# Patient Record
Sex: FEMALE | Race: WHITE | Employment: OTHER | URBAN - METROPOLITAN AREA
[De-identification: names, ages, dates, MRNs, and addresses within clinical notes are randomized per-mention and may not be internally consistent; named-entity substitution may affect disease eponyms.]

---

## 2017-01-19 ENCOUNTER — OFFICE VISIT (OUTPATIENT)
Dept: INTERNAL MEDICINE CLINIC | Facility: CLINIC | Age: 57
End: 2017-01-19

## 2017-01-19 VITALS
DIASTOLIC BLOOD PRESSURE: 68 MMHG | HEIGHT: 64 IN | WEIGHT: 213 LBS | SYSTOLIC BLOOD PRESSURE: 102 MMHG | BODY MASS INDEX: 36.37 KG/M2 | RESPIRATION RATE: 16 BRPM | HEART RATE: 80 BPM

## 2017-01-19 VITALS — BODY MASS INDEX: 37 KG/M2 | WEIGHT: 215 LBS

## 2017-01-19 DIAGNOSIS — E66.9 OBESITY (BMI 30-39.9): ICD-10-CM

## 2017-01-19 DIAGNOSIS — E66.9 OBESITY, CLASS II, BMI 35-39.9: ICD-10-CM

## 2017-01-19 DIAGNOSIS — Z51.81 ENCOUNTER FOR THERAPEUTIC DRUG MONITORING: Primary | ICD-10-CM

## 2017-01-19 PROCEDURE — 99213 OFFICE O/P EST LOW 20 MIN: CPT | Performed by: NURSE PRACTITIONER

## 2017-01-19 PROCEDURE — 97803 MED NUTRITION INDIV SUBSEQ: CPT | Performed by: DIETITIAN, REGISTERED

## 2017-01-19 RX ORDER — TOPIRAMATE 25 MG/1
50 TABLET ORAL 2 TIMES DAILY
Qty: 120 TABLET | Refills: 1 | Status: SHIPPED | OUTPATIENT
Start: 2017-01-19 | End: 2017-02-16 | Stop reason: SINTOL

## 2017-01-19 NOTE — PATIENT INSTRUCTIONS
Keep up the exercise!! Keep eating more protein than carbs use ideas from dietician. Healthy Tips for Eating Out  You don't have to give up eating out to cut down on fat, cholesterol, and salt. You just need to think about what you order.  Many men

## 2017-01-19 NOTE — PROGRESS NOTES
FOLLOW UP NUTRITION CONSULTATION    Nutrition Assessment    Number of consultations with dietitian: 2    Height:  Ht Readings from Last 1 Encounters:  12/15/16 : 64\"      Weight:   Wt Readings from Last 2 Encounters:  01/19/17 : 215 lb  12/15/16 : 218

## 2017-01-19 NOTE — PROGRESS NOTES
CC: Patient presents with:  Weight Check: lost 5 pounds       HPI:   Obesity. Patient is tolerating topamax without problems and has helped her reduce cars an dfeels more full.        Current Outpatient Prescriptions:  topiramate 25 MG Oral Tab Take 2 repeat u/s   • Colon polyp 2012 (due 12/2015)     DR. Judge Hare   • COPD (chronic obstructive pulmonary disease) ([facility] [address]    • Asthma      last inhaler use few weeks ago   • Arrhythmia      history A-fib post left lung surgery; states no longer, sees cardiology afshan Signed Prescriptions Disp Refills    topiramate 25 MG Oral Tab 120 tablet 1      Sig: Take 2 tablets (50 mg total) by mouth 2 (two) times daily.           None     ASSESSMENT:   Encounter for therapeutic drug monitoring  (primary encounter diagnosis)  O

## 2017-02-16 ENCOUNTER — OFFICE VISIT (OUTPATIENT)
Dept: INTERNAL MEDICINE CLINIC | Facility: CLINIC | Age: 57
End: 2017-02-16

## 2017-02-16 VITALS
WEIGHT: 213 LBS | RESPIRATION RATE: 16 BRPM | HEART RATE: 100 BPM | DIASTOLIC BLOOD PRESSURE: 74 MMHG | BODY MASS INDEX: 36.37 KG/M2 | HEIGHT: 64 IN | SYSTOLIC BLOOD PRESSURE: 110 MMHG

## 2017-02-16 DIAGNOSIS — Z51.81 ENCOUNTER FOR THERAPEUTIC DRUG MONITORING: Primary | ICD-10-CM

## 2017-02-16 DIAGNOSIS — E66.9 OBESITY (BMI 30-39.9): ICD-10-CM

## 2017-02-16 PROCEDURE — 99213 OFFICE O/P EST LOW 20 MIN: CPT | Performed by: NURSE PRACTITIONER

## 2017-02-16 RX ORDER — TOPIRAMATE 25 MG/1
50 TABLET ORAL 2 TIMES DAILY
Qty: 120 TABLET | Refills: 1 | Status: CANCELLED | OUTPATIENT
Start: 2017-02-16 | End: 2017-04-17

## 2017-02-16 RX ORDER — METFORMIN HYDROCHLORIDE 750 MG/1
750 TABLET, EXTENDED RELEASE ORAL DAILY
Qty: 30 TABLET | Refills: 0 | Status: SHIPPED | OUTPATIENT
Start: 2017-02-16 | End: 2017-02-16

## 2017-02-16 RX ORDER — METFORMIN HYDROCHLORIDE 750 MG/1
750 TABLET, EXTENDED RELEASE ORAL DAILY
Qty: 30 TABLET | Refills: 0 | Status: SHIPPED | OUTPATIENT
Start: 2017-02-16 | End: 2017-03-16

## 2017-02-16 NOTE — PATIENT INSTRUCTIONS
MyPlate Worksheet: 2,885 Calories  Your calorie needs are about 1,400 calories a day. Below are the U.S. Department of Agriculture (USDA) guidelines for your daily recommended amount of each food group.   Vegetables  1½ cups Fruits  1½ cups Grains  5 ounc · Cut back on salt (sodium). Stay under 2,300 mg sodium a day. If you have a health condition such as heart disease or high blood pressure, your doctor will likely tell you to limit sodium to no more than 1,500 mg a day.   Get moving and be active!   Aim fo 2 tablespopravin vernonmus     Date Last Reviewed: 6/1/2015  © 5953-3806 The [address] [place] [address], [address] [place], [place] [address]. All rights reserved. This information is not intended as a substitute for professional medical care.  Always follow your · Limit the extras (solid fats and sugars, also called \"empty calories\") to 130 calories a day. · Cut back on salt (sodium). Stay under 2,300 mg sodium a day.  If you have a health condition such as heart disease or high blood pressure, your doctor will 1 tablespoon peanut or almond butter  ¼ cup cooked dry beans or peas  ½ cup tofu  2 tablespoons hummus     Date Last Reviewed: 6/1/2015  © 3427-6413 [address]. All rights reserved.  This information

## 2017-02-16 NOTE — PROGRESS NOTES
CC: Patient presents with:  Weight Check: no weight change       HPI:   Obesity. Patient did not like the increaed topamax as felt very foggy on it and too much paresthesia.  She does not want to try 25mg again as had done this month and parasthesia ring.   • HOSPITALIZATIONS 5/09     hosp for dyspnea- echo neg, stress neg, no pe, add advair, question gerd   • Nontoxic multinodular goiter 2012     few nodule left- due in aug 2012 repeat u/s   • Colon polyp 2012 (due 12/2015)     DR. Deena Guallpa   • COPD ( CARDIO: RRR without murmur  GI: +BS's    No orders of the defined types were placed in this encounter.         Signed Prescriptions Disp Refills    MetFORMIN HCl  MG Oral Tablet 24 Hr 30 tablet 0      Sig: Take 1 tablet (750 mg total) by mouth daily

## 2017-02-20 PROBLEM — M23.91 INTERNAL DERANGEMENT OF KNEE, RIGHT: Status: ACTIVE | Noted: 2017-02-20

## 2017-02-22 ENCOUNTER — APPOINTMENT (OUTPATIENT)
Dept: LAB | Age: 57
End: 2017-02-22
Attending: SURGERY
Payer: COMMERCIAL

## 2017-02-22 DIAGNOSIS — E04.1 THYROID NODULE: ICD-10-CM

## 2017-02-22 PROCEDURE — 88173 CYTOPATH EVAL FNA REPORT: CPT

## 2017-02-28 NOTE — PROGRESS NOTES
Quick Note:    isaiah, please notify patient with benign biopsy of the thyroid  Repeat US in 12 months for stability    ______

## 2017-03-10 PROBLEM — M23.92 INTERNAL DERANGEMENT OF KNEE, LEFT: Status: ACTIVE | Noted: 2017-03-10

## 2017-03-16 ENCOUNTER — OFFICE VISIT (OUTPATIENT)
Dept: INTERNAL MEDICINE CLINIC | Facility: CLINIC | Age: 57
End: 2017-03-16

## 2017-03-16 VITALS
RESPIRATION RATE: 16 BRPM | HEART RATE: 96 BPM | DIASTOLIC BLOOD PRESSURE: 72 MMHG | WEIGHT: 209 LBS | SYSTOLIC BLOOD PRESSURE: 108 MMHG | HEIGHT: 64 IN | BODY MASS INDEX: 35.68 KG/M2

## 2017-03-16 DIAGNOSIS — J01.91 ACUTE RECURRENT SINUSITIS, UNSPECIFIED LOCATION: ICD-10-CM

## 2017-03-16 DIAGNOSIS — E66.9 OBESITY (BMI 30-39.9): ICD-10-CM

## 2017-03-16 DIAGNOSIS — Z51.81 ENCOUNTER FOR THERAPEUTIC DRUG MONITORING: Primary | ICD-10-CM

## 2017-03-16 DIAGNOSIS — J06.9 UPPER RESPIRATORY TRACT INFECTION, UNSPECIFIED TYPE: ICD-10-CM

## 2017-03-16 PROCEDURE — 99213 OFFICE O/P EST LOW 20 MIN: CPT | Performed by: NURSE PRACTITIONER

## 2017-03-16 RX ORDER — METFORMIN HYDROCHLORIDE 750 MG/1
750 TABLET, EXTENDED RELEASE ORAL DAILY
Qty: 30 TABLET | Refills: 1 | Status: SHIPPED | OUTPATIENT
Start: 2017-03-16 | End: 2017-04-13

## 2017-03-16 RX ORDER — AZITHROMYCIN 250 MG/1
TABLET, FILM COATED ORAL
Qty: 6 TABLET | Refills: 0 | Status: SHIPPED | OUTPATIENT
Start: 2017-03-16 | End: 2017-03-21

## 2017-03-16 RX ORDER — LACTOBACIL 2/BIFIDO 1/S.THERMO 450B CELL
1 PACKET (EA) ORAL EVERY MORNING
Qty: 60 CAPSULE | Refills: 0 | Status: SHIPPED | OUTPATIENT
Start: 2017-03-16 | End: 2017-03-21

## 2017-03-16 NOTE — PROGRESS NOTES
CC: Patient presents with:  Weight Check: lost 4 pounds       HPI:   Obesity. Patient is doing well on metformin. Is coughing a more today. Sinuses draining in to throat congested in upper airway URI. She os going to PT for left knee injury and pain. Rfl: 1      Past Medical History   Diagnosis Date   • Allergic rhinitis, cause unspecified    • lung cancer 09/2003- see birth hx     Lung CA- neg pet  10/2010, RLL nodule unchanged from 09- benign   • GERD EGD (Ali)1/07 1/07 = EGD = schatzkis ring. Internal derangement of knee, left        REVIEW OF SYSTEMS:   RESPIRATORY: denies shortness of breath   CARDIOVASCULAR: denies chest pain  GI: denies diarrhea and GI upset    EXAM:   /72 mmHg  Pulse 96  Resp 16  Ht 64\"  Wt 209 lb  BMI 35.86 kg/m2 for weekend. She has cough medicine she takes at night. Lung sounds okay no wheezes or crackles noted on auscultation but congested upper aware. Started azithromyacin as a courtesy due to inabiliity to get in with PCP before leaving [facility].  Due to previous

## 2017-04-13 ENCOUNTER — OFFICE VISIT (OUTPATIENT)
Dept: INTERNAL MEDICINE CLINIC | Facility: CLINIC | Age: 57
End: 2017-04-13

## 2017-04-13 VITALS
WEIGHT: 207 LBS | DIASTOLIC BLOOD PRESSURE: 70 MMHG | BODY MASS INDEX: 35.34 KG/M2 | RESPIRATION RATE: 16 BRPM | SYSTOLIC BLOOD PRESSURE: 110 MMHG | HEIGHT: 64 IN | HEART RATE: 88 BPM

## 2017-04-13 DIAGNOSIS — E66.9 OBESITY (BMI 30-39.9): ICD-10-CM

## 2017-04-13 DIAGNOSIS — E66.9 OBESITY, CLASS II, BMI 35-39.9: ICD-10-CM

## 2017-04-13 DIAGNOSIS — Z51.81 ENCOUNTER FOR THERAPEUTIC DRUG MONITORING: Primary | ICD-10-CM

## 2017-04-13 PROCEDURE — 97803 MED NUTRITION INDIV SUBSEQ: CPT | Performed by: DIETITIAN, REGISTERED

## 2017-04-13 PROCEDURE — 99213 OFFICE O/P EST LOW 20 MIN: CPT | Performed by: NURSE PRACTITIONER

## 2017-04-13 RX ORDER — METFORMIN HYDROCHLORIDE 750 MG/1
1500 TABLET, EXTENDED RELEASE ORAL DAILY
Qty: 60 TABLET | Refills: 1 | Status: SHIPPED | OUTPATIENT
Start: 2017-04-13 | End: 2017-05-09

## 2017-04-13 NOTE — PROGRESS NOTES
CC: Patient presents with:  Weight Check: lost 2 pounds       HPI:   Obesity. Tolerating metformin and doing okay. She ended up having a 5 day prednisone pack twice and is on 3rd type of antibiotic to help clear her one lung and sinuses.       Maria Luisa Quintana 2 puffs into the lungs every 6 (six) hours as needed.  Disp: 1 Inhaler Rfl: 1      Past Medical History   Diagnosis Date   • Allergic rhinitis, cause unspecified    • lung cancer 09/2003- see birth hx     Lung CA- neg pet  10/2010, RLL nodule unchanged from epicondylitis     DULCE MARIA on CPAP     Internal derangement of knee, right     Internal derangement of knee, left        REVIEW OF SYSTEMS:   RESPIRATORY: denies shortness of breath   CARDIOVASCULAR: denies chest pain  GI: denies diarrhea.      EXAM:   /70

## 2017-04-13 NOTE — PATIENT INSTRUCTIONS
Healthy Tips for Eating Out  You don't have to give up eating out to cut down on fat, cholesterol, and salt. You just need to think about what you order. Many menus highlight low-fat and low-sodium dishes. But if you can't find what you want, ask.  Gillian Dudley You don't have to give up eating out to cut down on fat, cholesterol, and salt. You just need to think about what you order. Many menus highlight low-fat and low-sodium dishes. But if you can't find what you want, ask.  Explain what you need to the  o

## 2017-04-14 VITALS — WEIGHT: 207 LBS | BODY MASS INDEX: 36 KG/M2

## 2017-04-14 NOTE — PROGRESS NOTES
FOLLOW UP NUTRITION CONSULTATION    Nutrition Assessment    Number of consultations with dietitian: 3    Height:  Ht Readings from Last 1 Encounters:  04/13/17 : 64\"      Weight:   Wt Readings from Last 2 Encounters:  04/13/17 : 207 lb  04/13/17 : 207

## 2017-05-09 ENCOUNTER — OFFICE VISIT (OUTPATIENT)
Dept: INTERNAL MEDICINE CLINIC | Facility: CLINIC | Age: 57
End: 2017-05-09

## 2017-05-09 VITALS
HEART RATE: 96 BPM | BODY MASS INDEX: 34.66 KG/M2 | WEIGHT: 203 LBS | HEIGHT: 64 IN | DIASTOLIC BLOOD PRESSURE: 76 MMHG | SYSTOLIC BLOOD PRESSURE: 112 MMHG | RESPIRATION RATE: 16 BRPM

## 2017-05-09 DIAGNOSIS — Z51.81 ENCOUNTER FOR THERAPEUTIC DRUG MONITORING: Primary | ICD-10-CM

## 2017-05-09 DIAGNOSIS — E66.9 OBESITY (BMI 30-39.9): ICD-10-CM

## 2017-05-09 DIAGNOSIS — K21.00 GASTROESOPHAGEAL REFLUX DISEASE WITH ESOPHAGITIS: ICD-10-CM

## 2017-05-09 PROCEDURE — 99213 OFFICE O/P EST LOW 20 MIN: CPT | Performed by: NURSE PRACTITIONER

## 2017-05-09 RX ORDER — METFORMIN HYDROCHLORIDE 750 MG/1
1500 TABLET, EXTENDED RELEASE ORAL DAILY
Qty: 60 TABLET | Refills: 1 | Status: SHIPPED | OUTPATIENT
Start: 2017-05-09 | End: 2017-07-11

## 2017-05-09 NOTE — PATIENT INSTRUCTIONS
Try to eat dinner earlier and see if helps with GERD       Lifestyle Changes for Controlling GERD    When you have GERD, stomach acid feels as if it’s backing up toward your mouth.  Whether or not you take medicine to control your GERD, your symptoms can of © 8344-6775 [address], [address] [place]. All rights reserved. This information is not intended as a substitute for professional medical care. Always follow your healthcare professional's instructions.

## 2017-05-09 NOTE — PROGRESS NOTES
CC: Patient presents with:  Weight Check: lost 4 pounds       HPI:   Obesity. Patient is doing well on metformin and feels trokendi helped and no side effects like she had on topamax. She has been having more GERD symptoms as eating a lot later.  Sh Inhale 2 puffs into the lungs every 6 (six) hours as needed.  Disp: 1 Inhaler Rfl: 1      Past Medical History   Diagnosis Date   • Allergic rhinitis, cause unspecified    • lung cancer 09/2003- see birth hx     Lung CA- neg pet  10/2010, RLL nodule unchang lateral epicondylitis     DULCE MARIA on CPAP     Internal derangement of knee, right     Internal derangement of knee, left     Gastroesophageal reflux disease with esophagitis        REVIEW OF SYSTEMS:   RESPIRATORY: denies shortness of breath   CARDIOVASCULAR: and associated with back pain, jaw pain, arm pain or nausea. She states she is aware of symptoms. The patient indicates understanding of these issues and agrees to the plan. Return in about 4 weeks (around 6/6/2017).

## 2017-05-31 RX ORDER — TOPIRAMATE 25 MG/1
CAPSULE, EXTENDED RELEASE ORAL
Qty: 30 CAPSULE | Refills: 0 | OUTPATIENT
Start: 2017-05-31

## 2017-06-06 ENCOUNTER — PATIENT MESSAGE (OUTPATIENT)
Dept: INTERNAL MEDICINE CLINIC | Facility: CLINIC | Age: 57
End: 2017-06-06

## 2017-06-06 DIAGNOSIS — E66.9 OBESITY (BMI 30-39.9): ICD-10-CM

## 2017-06-06 DIAGNOSIS — Z51.81 ENCOUNTER FOR THERAPEUTIC DRUG MONITORING: ICD-10-CM

## 2017-06-06 NOTE — TELEPHONE ENCOUNTER
From: Oxana Butts  To: JL Cruz  Sent: 6/6/2017 11:53 AM CDT  Subject: Prescription Question    My Trokendi was not approved for a refill by you?  I dont have an appointment for this month because of a cancellation by your office, awilda

## 2017-06-06 NOTE — TELEPHONE ENCOUNTER
From: Helen Capellan  To: JL Macias  Sent: 6/6/2017 11:51 AM CDT  Subject: Medication Renewal Request    Original authorizing provider: JL Smith would like a refill of the following medications:  Topiramate E

## 2017-06-07 ENCOUNTER — PATIENT MESSAGE (OUTPATIENT)
Dept: INTERNAL MEDICINE CLINIC | Facility: CLINIC | Age: 57
End: 2017-06-07

## 2017-06-07 RX ORDER — TOPIRAMATE 25 MG/1
CAPSULE, EXTENDED RELEASE ORAL
Qty: 30 CAPSULE | Refills: 0 | OUTPATIENT
Start: 2017-06-07

## 2017-06-07 NOTE — TELEPHONE ENCOUNTER
From: Heidi Carrillo  To: JL Murray  Sent: 6/7/2017 4:10 PM CDT  Subject: Prescription Question    Andre still states that there is no refills available for gulshanndi for me can you please send a new one?     Thank you    Jasmin Jiménez

## 2017-06-12 ENCOUNTER — PATIENT MESSAGE (OUTPATIENT)
Dept: INTERNAL MEDICINE CLINIC | Facility: CLINIC | Age: 57
End: 2017-06-12

## 2017-06-12 DIAGNOSIS — Z51.81 ENCOUNTER FOR THERAPEUTIC DRUG MONITORING: Primary | ICD-10-CM

## 2017-06-12 DIAGNOSIS — E66.9 OBESITY (BMI 30-39.9): ICD-10-CM

## 2017-06-12 NOTE — TELEPHONE ENCOUNTER
From: Compa Linares  To: JL Sutherland  Sent: 6/12/2017 7:56 AM CDT  Subject: Prescription Question    Yasir Andrews,    I get my prescriptions filled st the [facility]     Thanks for your help    Palomo

## 2017-07-11 ENCOUNTER — OFFICE VISIT (OUTPATIENT)
Dept: INTERNAL MEDICINE CLINIC | Facility: CLINIC | Age: 57
End: 2017-07-11

## 2017-07-11 VITALS
BODY MASS INDEX: 34.49 KG/M2 | SYSTOLIC BLOOD PRESSURE: 106 MMHG | HEART RATE: 80 BPM | DIASTOLIC BLOOD PRESSURE: 70 MMHG | WEIGHT: 202 LBS | RESPIRATION RATE: 16 BRPM | HEIGHT: 64 IN

## 2017-07-11 DIAGNOSIS — Z85.118 PERSONAL HISTORY OF MALIGNANT NEOPLASM OF BRONCHUS AND LUNG: ICD-10-CM

## 2017-07-11 DIAGNOSIS — K21.9 GASTROESOPHAGEAL REFLUX DISEASE WITHOUT ESOPHAGITIS: ICD-10-CM

## 2017-07-11 DIAGNOSIS — E66.9 OBESITY (BMI 30-39.9): ICD-10-CM

## 2017-07-11 DIAGNOSIS — Z51.81 ENCOUNTER FOR THERAPEUTIC DRUG MONITORING: ICD-10-CM

## 2017-07-11 PROCEDURE — 99213 OFFICE O/P EST LOW 20 MIN: CPT | Performed by: NURSE PRACTITIONER

## 2017-07-11 RX ORDER — METFORMIN HYDROCHLORIDE 750 MG/1
750 TABLET, EXTENDED RELEASE ORAL
COMMUNITY
End: 2017-07-11

## 2017-07-11 RX ORDER — METFORMIN HYDROCHLORIDE 750 MG/1
1500 TABLET, EXTENDED RELEASE ORAL DAILY
Qty: 60 TABLET | Refills: 1 | Status: SHIPPED | OUTPATIENT
Start: 2017-07-11 | End: 2017-09-05

## 2017-07-11 RX ORDER — BUPROPION HYDROCHLORIDE 75 MG/1
75 TABLET ORAL 2 TIMES DAILY
Qty: 60 TABLET | Refills: 1 | Status: SHIPPED | OUTPATIENT
Start: 2017-07-11 | End: 2017-09-05

## 2017-07-11 NOTE — PATIENT INSTRUCTIONS
Keys to Managing Stress  There are several keys to managing stress. First, learn to recognize when you’re under stress and what triggers it. Next, find positive ways of responding to your triggers.  Be sure to take good care of your health and make time t © 8089-0102 [address], [address] [place]. All rights reserved. This information is not intended as a substitute for professional medical care. Always follow your healthcare professional's instructions.

## 2017-07-11 NOTE — PROGRESS NOTES
CC: Patient presents with:  Weight Check: lost 1 pound       HPI:   Obesity. Patient is doing well on metformin and feels trokendi helped and no side effects like she had on topamax.  She has a lot more stress as going through a divorce as  al disease as described. Bridging in the mid LAD. LV function.-intact    • Colon polyp 2012 (due 12/2015)    DR. Ruiz   • COPD (chronic obstructive pulmonary disease) ([facility] [address]    • GERD EGD (Sara)1/07 1/07 = EGD = schatzkis ring.    • HOSPITALIZATIONS 5/09    ho and motivated, A/O x3  HEENT: throat is clear, PERRLA  LUNGS: CTA bilat   CARDIO: RRR without murmur  GI: +BS's    No orders of the defined types were placed in this encounter.        Signed Prescriptions Disp Refills    Topiramate ER (TROKENDI XR) 25 MG Or

## 2017-08-08 ENCOUNTER — OFFICE VISIT (OUTPATIENT)
Dept: INTERNAL MEDICINE CLINIC | Facility: CLINIC | Age: 57
End: 2017-08-08

## 2017-08-08 VITALS
HEART RATE: 96 BPM | RESPIRATION RATE: 16 BRPM | DIASTOLIC BLOOD PRESSURE: 72 MMHG | SYSTOLIC BLOOD PRESSURE: 126 MMHG | HEIGHT: 64 IN | WEIGHT: 197 LBS | BODY MASS INDEX: 33.63 KG/M2

## 2017-08-08 DIAGNOSIS — F43.9 STRESS AT HOME: ICD-10-CM

## 2017-08-08 DIAGNOSIS — Z51.81 ENCOUNTER FOR THERAPEUTIC DRUG MONITORING: Primary | ICD-10-CM

## 2017-08-08 DIAGNOSIS — E66.9 OBESITY (BMI 30-39.9): ICD-10-CM

## 2017-08-08 PROCEDURE — 99213 OFFICE O/P EST LOW 20 MIN: CPT | Performed by: NURSE PRACTITIONER

## 2017-08-08 RX ORDER — BUPROPION HYDROCHLORIDE 75 MG/1
75 TABLET ORAL 2 TIMES DAILY
Qty: 60 TABLET | Refills: 1 | Status: CANCELLED | OUTPATIENT
Start: 2017-08-08 | End: 2017-10-07

## 2017-08-08 RX ORDER — METFORMIN HYDROCHLORIDE 750 MG/1
1500 TABLET, EXTENDED RELEASE ORAL DAILY
Qty: 60 TABLET | Refills: 1 | Status: CANCELLED | OUTPATIENT
Start: 2017-08-08 | End: 2017-10-07

## 2017-08-08 NOTE — PATIENT INSTRUCTIONS
KEEP UP THE GREAT WORK! Stress Relief: Relaxation  Focusing the mind helps provide stress relief. Taking 5 to 10 minutes to practice relaxation each day helps you feel more refreshed. The following exercises can be done almost anywhere.  Try one or mor © 9402-0076 [address], [address] [place]. All rights reserved. This information is not intended as a substitute for professional medical care. Always follow your healthcare professional's instructions.

## 2017-08-08 NOTE — PROGRESS NOTES
CC: Patient presents with:  Weight Check: lost 5 pounds       HPI:   Obesity. Patient is doing well on metformin, trokendi and feels bupropion really helped with stress eating. Stress remains high as going though divorce. and going through rehab. disease) 6/2015 LHC/GSH    LHC- mild to moderate coronary disease as described. Bridging in the mid LAD. LV function.-intact    • Colon polyp 2012 (due 12/2015)    DR. Ruiz   • COPD (chronic obstructive pulmonary disease) ([facility] [address]    • GERD EGD (Sara)1/07 1/ 64\"   Wt 197 lb   BMI 33.81 kg/m²   GENERAL: Pleasant and motivated, A/O x3  HEENT: throat is clear, PERRLA  LUNGS: CTA bilat   CARDIO: RRR without murmur  GI: +BS's    No orders of the defined types were placed in this encounter.        No prescriptions r

## 2017-09-05 ENCOUNTER — OFFICE VISIT (OUTPATIENT)
Dept: INTERNAL MEDICINE CLINIC | Facility: CLINIC | Age: 57
End: 2017-09-05

## 2017-09-05 VITALS
WEIGHT: 200 LBS | RESPIRATION RATE: 16 BRPM | BODY MASS INDEX: 34.15 KG/M2 | DIASTOLIC BLOOD PRESSURE: 62 MMHG | SYSTOLIC BLOOD PRESSURE: 112 MMHG | HEIGHT: 64 IN | HEART RATE: 88 BPM

## 2017-09-05 DIAGNOSIS — Z51.81 ENCOUNTER FOR THERAPEUTIC DRUG MONITORING: ICD-10-CM

## 2017-09-05 DIAGNOSIS — E66.9 OBESITY (BMI 30-39.9): ICD-10-CM

## 2017-09-05 PROCEDURE — 99213 OFFICE O/P EST LOW 20 MIN: CPT | Performed by: NURSE PRACTITIONER

## 2017-09-05 RX ORDER — METFORMIN HYDROCHLORIDE 750 MG/1
1500 TABLET, EXTENDED RELEASE ORAL DAILY
Qty: 60 TABLET | Refills: 1 | Status: SHIPPED | OUTPATIENT
Start: 2017-09-05 | End: 2017-09-26

## 2017-09-05 RX ORDER — BUPROPION HYDROCHLORIDE 75 MG/1
150 TABLET ORAL 2 TIMES DAILY
Qty: 120 TABLET | Refills: 1 | Status: SHIPPED | OUTPATIENT
Start: 2017-09-05 | End: 2017-09-26 | Stop reason: DRUGHIGH

## 2017-09-05 NOTE — PROGRESS NOTES
CC: Patient presents with:  Weight Check: 3 pound weight gain       HPI:   Obesity. Patient is doing well on metformin, trokendi and feels bupropion really helped with stress eating. Stress remains high as going though divorce.  Complains of nails jaclyn inhaler use few weeks ago   • CAD (coronary artery disease) 6/2015 LHC/GSH    LHC- mild to moderate coronary disease as described. Bridging in the mid LAD. LV function.-intact    • Colon polyp 2012 (due 12/2015)    DR. Ruiz   • COPD (chronic obstructive pul constipation    EXAM:   /62   Pulse 88   Resp 16   Ht 64\"   Wt 200 lb   BMI 34.33 kg/m²   GENERAL: Pleasant and motivated, A/O x3  HEENT: throat is clear, PERRLA  LUNGS: CTA bilat   CARDIO: RRR without murmur  GI: +BS's    No orders of the defined t

## 2017-09-05 NOTE — PATIENT INSTRUCTIONS
Try adding biotin supplement daily for nails. Eating Out: Tips for Making Healthy Choices    It’s not easy to change the habits of a lifetime. Experts say that it can take 6 months just to change one old habit for a healthier one.  That’s why gradual · Try starting your meal with a bowl of vegetable soup or a salad. This may help to prevent you from overeating during the meal.  · Order meat, poultry, and fish broiled, grilled, baked, poached, or steamed. Always remove the skin from chicken.   · Choose M

## 2017-09-21 PROCEDURE — 88305 TISSUE EXAM BY PATHOLOGIST: CPT | Performed by: INTERNAL MEDICINE

## 2017-09-26 ENCOUNTER — OFFICE VISIT (OUTPATIENT)
Dept: INTERNAL MEDICINE CLINIC | Facility: CLINIC | Age: 57
End: 2017-09-26

## 2017-09-26 VITALS
SYSTOLIC BLOOD PRESSURE: 118 MMHG | HEIGHT: 64 IN | RESPIRATION RATE: 16 BRPM | WEIGHT: 199 LBS | HEART RATE: 84 BPM | BODY MASS INDEX: 33.97 KG/M2 | DIASTOLIC BLOOD PRESSURE: 70 MMHG

## 2017-09-26 DIAGNOSIS — E66.9 OBESITY (BMI 30-39.9): Primary | ICD-10-CM

## 2017-09-26 DIAGNOSIS — Z51.81 ENCOUNTER FOR THERAPEUTIC DRUG MONITORING: ICD-10-CM

## 2017-09-26 DIAGNOSIS — F43.9 STRESS: ICD-10-CM

## 2017-09-26 PROCEDURE — 99213 OFFICE O/P EST LOW 20 MIN: CPT | Performed by: NURSE PRACTITIONER

## 2017-09-26 RX ORDER — LACTOBACIL 2/BIFIDO 1/S.THERMO 450B CELL
1 PACKET (EA) ORAL EVERY MORNING
Qty: 60 CAPSULE | Refills: 1 | Status: SHIPPED | OUTPATIENT
Start: 2017-09-26 | End: 2017-10-03

## 2017-09-26 RX ORDER — BUPROPION HYDROCHLORIDE 150 MG/1
150 TABLET, EXTENDED RELEASE ORAL 2 TIMES DAILY
Qty: 60 TABLET | Refills: 0 | Status: SHIPPED | OUTPATIENT
Start: 2017-09-26 | End: 2017-10-31

## 2017-09-26 RX ORDER — METFORMIN HYDROCHLORIDE 750 MG/1
1500 TABLET, EXTENDED RELEASE ORAL DAILY
Qty: 60 TABLET | Refills: 1 | Status: SHIPPED | OUTPATIENT
Start: 2017-09-26 | End: 2017-10-31

## 2017-09-26 NOTE — PATIENT INSTRUCTIONS
Stress Relief: Relaxation  Focusing the mind helps provide stress relief. Taking 5 to 10 minutes to practice relaxation each day helps you feel more refreshed. The following exercises can be done almost anywhere.  Try one or more until you find what works © 0736-8163 [address], [address] [place]. All rights reserved. This information is not intended as a substitute for professional medical care. Always follow your healthcare professional's instructions.

## 2017-09-26 NOTE — PROGRESS NOTES
CC: Patient presents with:  Weight Check: down 1 lb       HPI:   Obesity. Patient is doing well on metformin, trokendi and feels increase in bupropion  Helped with stress eating. Stress remains high as going though divorce.  Is still dancing and has b OR) Take  by mouth. Disp:  Rfl:    albuterol (PROVENTIL,VENTOLIN) 90 MCG/ACT Inhalation Aero Soln Inhale 2 puffs into the lungs every 6 (six) hours as needed.  Disp: 1 Inhaler Rfl: 1      Past Medical History:   Diagnosis Date   • Allergic rhinitis, cause u fracture, left     left ankle fib fx  global exp 2/22/16     Ankle pain, left     Right lateral epicondylitis     DULCE MARIA on CPAP     Internal derangement of knee, right     Internal derangement of knee, left     Gastroesophageal reflux disease with esophagiti clinic and she is open to meeting with her and referral put in. The patient indicates understanding of these issues and agrees to the plan. Return in about 4 weeks (around 10/24/2017).

## 2017-10-31 ENCOUNTER — OFFICE VISIT (OUTPATIENT)
Dept: INTERNAL MEDICINE CLINIC | Facility: CLINIC | Age: 57
End: 2017-10-31

## 2017-10-31 VITALS
HEIGHT: 64 IN | WEIGHT: 195 LBS | HEART RATE: 88 BPM | DIASTOLIC BLOOD PRESSURE: 70 MMHG | SYSTOLIC BLOOD PRESSURE: 112 MMHG | RESPIRATION RATE: 16 BRPM | BODY MASS INDEX: 33.29 KG/M2

## 2017-10-31 DIAGNOSIS — E66.9 OBESITY (BMI 30-39.9): ICD-10-CM

## 2017-10-31 DIAGNOSIS — Z51.81 ENCOUNTER FOR THERAPEUTIC DRUG MONITORING: ICD-10-CM

## 2017-10-31 PROCEDURE — 99213 OFFICE O/P EST LOW 20 MIN: CPT | Performed by: NURSE PRACTITIONER

## 2017-10-31 RX ORDER — METFORMIN HYDROCHLORIDE 750 MG/1
1500 TABLET, EXTENDED RELEASE ORAL DAILY
Qty: 60 TABLET | Refills: 2 | Status: SHIPPED | OUTPATIENT
Start: 2017-10-31 | End: 2018-01-29 | Stop reason: WASHOUT

## 2017-10-31 RX ORDER — BUPROPION HYDROCHLORIDE 150 MG/1
150 TABLET, EXTENDED RELEASE ORAL 2 TIMES DAILY
Qty: 60 TABLET | Refills: 0 | Status: CANCELLED | OUTPATIENT
Start: 2017-10-31

## 2017-10-31 RX ORDER — BUPROPION HYDROCHLORIDE 150 MG/1
150 TABLET, EXTENDED RELEASE ORAL 2 TIMES DAILY
Qty: 60 TABLET | Refills: 2 | Status: SHIPPED | OUTPATIENT
Start: 2017-10-31 | End: 2018-01-29

## 2017-10-31 NOTE — PROGRESS NOTES
CC: Patient presents with:  Weight Check: lsot 4 pounds       HPI:   Obesity. Patient is doing well on metformin, trokendi and feels increase in bupropion helped with stress and stress eating.  Still going through divorce but still bowling and dancing the lungs every 6 (six) hours as needed.  Disp: 1 Inhaler Rfl: 1      Past Medical History:   Diagnosis Date   • Allergic rhinitis, cause unspecified    • Arrhythmia     history A-fib post left lung surgery; states no longer, sees cardiology yearly   • Asth on CPAP     Internal derangement of knee, right     Internal derangement of knee, left     Gastroesophageal reflux disease with esophagitis        REVIEW OF SYSTEMS:   RESPIRATORY: denies shortness of breath   CARDIOVASCULAR: denies chest pain  GI: denies

## 2017-10-31 NOTE — PATIENT INSTRUCTIONS
Weigh weekly and if increase of 5 lbs total make earlier appointment.     Try to call in on days in morning  you could come in as always have cancellations      Weight Management: Take It Off and Keep It Off    It’s easy to be motivated when you first start · Learn how to accept compliments. Even if you get embarrassed, just say “thank you.”  · Make a list of the things that others like about you and that you like about yourself. Add something new from time to time.  Keep this list to look at when you need a l

## 2017-11-02 PROBLEM — I25.10 CORONARY ARTERY DISEASE INVOLVING NATIVE CORONARY ARTERY OF NATIVE HEART WITHOUT ANGINA PECTORIS: Status: ACTIVE | Noted: 2017-11-02

## 2017-11-06 PROCEDURE — 81001 URINALYSIS AUTO W/SCOPE: CPT | Performed by: INTERNAL MEDICINE

## 2017-11-06 PROCEDURE — 87086 URINE CULTURE/COLONY COUNT: CPT | Performed by: INTERNAL MEDICINE

## 2017-11-06 PROCEDURE — 86803 HEPATITIS C AB TEST: CPT | Performed by: INTERNAL MEDICINE

## 2017-11-06 PROCEDURE — 82607 VITAMIN B-12: CPT | Performed by: INTERNAL MEDICINE

## 2017-11-27 DIAGNOSIS — E66.9 OBESITY (BMI 30-39.9): ICD-10-CM

## 2017-11-27 DIAGNOSIS — Z51.81 ENCOUNTER FOR THERAPEUTIC DRUG MONITORING: ICD-10-CM

## 2017-11-28 RX ORDER — METFORMIN HYDROCHLORIDE 750 MG/1
TABLET, EXTENDED RELEASE ORAL
Qty: 60 TABLET | Refills: 0 | OUTPATIENT
Start: 2017-11-28

## 2017-11-28 NOTE — TELEPHONE ENCOUNTER
From Andre in [facility]  Requesting: Metformin ER 750mg  2tabs QD  LOV: 10/31/17 Lucila Anne  Denies chest pain will continue metformin, trokendi and bupropion.   RTC: 3 months - 1/31/2018  Last Relevant Labs: 11/6/17 HGB A1C - 5.3  Filled: 10/31/17 #60 with

## 2017-11-30 ENCOUNTER — OFFICE VISIT (OUTPATIENT)
Dept: INTERNAL MEDICINE CLINIC | Facility: CLINIC | Age: 57
End: 2017-11-30

## 2017-11-30 VITALS
BODY MASS INDEX: 33.46 KG/M2 | DIASTOLIC BLOOD PRESSURE: 70 MMHG | HEIGHT: 64 IN | SYSTOLIC BLOOD PRESSURE: 108 MMHG | WEIGHT: 196 LBS | HEART RATE: 70 BPM | RESPIRATION RATE: 16 BRPM

## 2017-11-30 DIAGNOSIS — Z51.81 ENCOUNTER FOR THERAPEUTIC DRUG MONITORING: Primary | ICD-10-CM

## 2017-11-30 DIAGNOSIS — E66.9 OBESITY (BMI 30-39.9): ICD-10-CM

## 2017-11-30 PROCEDURE — 99213 OFFICE O/P EST LOW 20 MIN: CPT | Performed by: NURSE PRACTITIONER

## 2017-11-30 NOTE — PROGRESS NOTES
CC: Patient presents with:  Weight Check: one pound weight gain       HPI:   Obesity. Patient is doing well on metformin, trokendi and feels increase in bupropion helped with stress and stress eating.  She feels it may not show on scale but she feels aspirin 81 MG Oral Tab Take 81 mg by mouth daily. Disp:  Rfl:    Levocetirizine Dihydrochloride (XYZAL) 5 MG Oral Tab 1/2 tab daily Disp: 90 tablet Rfl: 1   Multiple Vitamins-Minerals (MULTIVITAMIN OR) Take  by mouth.  Disp:  Rfl:    albuterol (PROVENTIL, (BMI 30-39. 9)     Weight gain     Nontoxic multinodular goiter     SX/GLOBAL/  /SCSC/ LEFT KNEE SCOPE/ DOS 3-19-15 / EXP 6-17-15     Other and unspecified derangement of medial meniscus     Ankle fracture, left     left ankle fib fx  global exp 2 her.      The patient indicates understanding of these issues and agrees to the plan. Return in about 2 months (around 1/30/2018).

## 2017-11-30 NOTE — PATIENT INSTRUCTIONS
Diabetes: Learning About Serving and Portion Sizes     A good rule of thumb: Devote half your plate to vegetables and green salad. Split the other half between protein and starchy carbohydrates. Fruit makes a good dessert. Servings and portions.  What When you’re planning for a snack or a meal, keep servings in mind. If you don’t have measuring cups or a scale handy, there are ways to [facility] serving sizes, such as comparing your food to the size of your hand (see pictures above).   Managing portion si

## 2017-12-04 PROBLEM — M25.512 ACUTE PAIN OF LEFT SHOULDER: Status: ACTIVE | Noted: 2017-12-04

## 2017-12-29 PROBLEM — M54.12 CERVICAL RADICULITIS: Status: ACTIVE | Noted: 2017-12-29

## 2018-02-27 PROBLEM — N39.41 URGE INCONTINENCE: Status: ACTIVE | Noted: 2018-02-27

## 2018-02-27 PROBLEM — N32.81 OAB (OVERACTIVE BLADDER): Status: ACTIVE | Noted: 2018-02-27

## 2018-05-10 PROBLEM — G89.29 CHRONIC LEFT-SIDED LOW BACK PAIN WITH LEFT-SIDED SCIATICA: Status: ACTIVE | Noted: 2018-05-10

## 2018-05-10 PROBLEM — M54.42 CHRONIC LEFT-SIDED LOW BACK PAIN WITH LEFT-SIDED SCIATICA: Status: ACTIVE | Noted: 2018-05-10

## 2018-07-10 PROCEDURE — 86617 LYME DISEASE ANTIBODY: CPT | Performed by: INTERNAL MEDICINE

## 2018-07-10 PROCEDURE — 86618 LYME DISEASE ANTIBODY: CPT | Performed by: INTERNAL MEDICINE

## 2018-08-19 PROBLEM — A69.20 LYME DISEASE: Status: ACTIVE | Noted: 2018-08-19

## 2018-08-20 PROCEDURE — 36415 COLL VENOUS BLD VENIPUNCTURE: CPT | Performed by: INTERNAL MEDICINE

## 2018-08-20 PROCEDURE — 86753 PROTOZOA ANTIBODY NOS: CPT | Performed by: INTERNAL MEDICINE

## 2018-08-20 PROCEDURE — 86666 EHRLICHIA ANTIBODY: CPT | Performed by: INTERNAL MEDICINE

## 2018-11-06 PROCEDURE — 86803 HEPATITIS C AB TEST: CPT | Performed by: INTERNAL MEDICINE

## 2019-03-04 PROCEDURE — 82397 CHEMILUMINESCENT ASSAY: CPT | Performed by: INTERNAL MEDICINE

## 2019-03-04 PROCEDURE — 36415 COLL VENOUS BLD VENIPUNCTURE: CPT | Performed by: INTERNAL MEDICINE

## 2020-07-08 PROCEDURE — 88173 CYTOPATH EVAL FNA REPORT: CPT | Performed by: SURGERY

## 2020-07-14 PROBLEM — F32.A MILD DEPRESSION: Status: ACTIVE | Noted: 2020-07-14

## 2020-07-23 PROBLEM — D68.69 SECONDARY HYPERCOAGULABLE STATE (HCC): Status: ACTIVE | Noted: 2020-07-23

## 2020-08-13 PROBLEM — E03.9 ACQUIRED HYPOTHYROIDISM: Status: ACTIVE | Noted: 2020-08-13

## 2020-08-13 PROBLEM — D68.69 SECONDARY HYPERCOAGULABLE STATE (HCC): Status: RESOLVED | Noted: 2020-07-23 | Resolved: 2020-08-13

## 2020-11-12 PROBLEM — I70.0 ABDOMINAL AORTIC ATHEROSCLEROSIS (HCC): Status: ACTIVE | Noted: 2020-11-12

## 2021-02-04 PROBLEM — U07.1 PNEUMONIA DUE TO COVID-19 VIRUS: Status: ACTIVE | Noted: 2020-11-19

## 2021-02-04 PROBLEM — J12.82 PNEUMONIA DUE TO COVID-19 VIRUS: Status: ACTIVE | Noted: 2020-11-19

## 2021-03-25 PROBLEM — E66.01 SEVERE OBESITY (HCC): Status: ACTIVE | Noted: 2021-03-25

## 2021-03-25 PROBLEM — E11.69 MIXED HYPERLIPIDEMIA DUE TO TYPE 2 DIABETES MELLITUS (HCC): Status: ACTIVE | Noted: 2021-03-25

## 2021-03-25 PROBLEM — E78.2 MIXED HYPERLIPIDEMIA DUE TO TYPE 2 DIABETES MELLITUS (HCC): Status: ACTIVE | Noted: 2021-03-25

## 2021-05-21 PROBLEM — E66.01 SEVERE OBESITY (BMI 35.0-39.9) WITH COMORBIDITY (HCC): Status: ACTIVE | Noted: 2021-03-25

## 2021-07-08 PROBLEM — F32.A MILD DEPRESSION: Status: RESOLVED | Noted: 2020-07-14 | Resolved: 2021-07-08

## 2021-12-06 ENCOUNTER — LAB ENCOUNTER (OUTPATIENT)
Dept: LAB | Facility: HOSPITAL | Age: 61
End: 2021-12-06
Attending: INTERNAL MEDICINE
Payer: MEDICARE

## 2021-12-06 DIAGNOSIS — Z01.818 PRE-OP TESTING: ICD-10-CM

## 2021-12-09 ENCOUNTER — ANESTHESIA (OUTPATIENT)
Dept: ENDOSCOPY | Facility: HOSPITAL | Age: 61
End: 2021-12-09
Payer: MEDICARE

## 2021-12-09 ENCOUNTER — HOSPITAL ENCOUNTER (OUTPATIENT)
Facility: HOSPITAL | Age: 61
Setting detail: HOSPITAL OUTPATIENT SURGERY
Discharge: HOME | End: 2021-12-09
Attending: INTERNAL MEDICINE | Admitting: INTERNAL MEDICINE
Payer: MEDICARE

## 2021-12-09 ENCOUNTER — ANESTHESIA EVENT (OUTPATIENT)
Dept: ENDOSCOPY | Facility: HOSPITAL | Age: 61
End: 2021-12-09
Payer: MEDICARE

## 2021-12-09 VITALS
OXYGEN SATURATION: 99 % | BODY MASS INDEX: 34.83 KG/M2 | RESPIRATION RATE: 18 BRPM | DIASTOLIC BLOOD PRESSURE: 70 MMHG | WEIGHT: 204 LBS | HEART RATE: 89 BPM | SYSTOLIC BLOOD PRESSURE: 114 MMHG | HEIGHT: 64 IN

## 2021-12-09 DIAGNOSIS — K64.9 HEMORRHOIDS: ICD-10-CM

## 2021-12-09 DIAGNOSIS — K31.7 GASTRIC POLYPS: ICD-10-CM

## 2021-12-09 DIAGNOSIS — Z01.818 PRE-OP TESTING: Primary | ICD-10-CM

## 2021-12-09 DIAGNOSIS — K63.5 SIGMOID POLYP: ICD-10-CM

## 2021-12-09 DIAGNOSIS — K57.90 DIVERTICULOSIS: ICD-10-CM

## 2021-12-09 DIAGNOSIS — K44.9 HIATAL HERNIA: ICD-10-CM

## 2021-12-09 DIAGNOSIS — Z86.010 HISTORY OF ADENOMATOUS POLYP OF COLON: ICD-10-CM

## 2021-12-09 DIAGNOSIS — K21.9 GASTROESOPHAGEAL REFLUX DISEASE, UNSPECIFIED WHETHER ESOPHAGITIS PRESENT: ICD-10-CM

## 2021-12-09 DIAGNOSIS — K22.2 SCHATZKI'S RING: ICD-10-CM

## 2021-12-09 DIAGNOSIS — K63.5 POLYP OF DESCENDING COLON: ICD-10-CM

## 2021-12-09 DIAGNOSIS — K63.5 CECAL POLYP: ICD-10-CM

## 2021-12-09 DIAGNOSIS — K31.7 MULTIPLE GASTRIC POLYPS: ICD-10-CM

## 2021-12-09 PROCEDURE — 82962 GLUCOSE BLOOD TEST: CPT

## 2021-12-09 PROCEDURE — 0DBH8ZX EXCISION OF CECUM, VIA NATURAL OR ARTIFICIAL OPENING ENDOSCOPIC, DIAGNOSTIC: ICD-10-PCS | Performed by: INTERNAL MEDICINE

## 2021-12-09 PROCEDURE — 0DB68ZZ EXCISION OF STOMACH, VIA NATURAL OR ARTIFICIAL OPENING ENDOSCOPIC: ICD-10-PCS | Performed by: INTERNAL MEDICINE

## 2021-12-09 PROCEDURE — 0DBN8ZX EXCISION OF SIGMOID COLON, VIA NATURAL OR ARTIFICIAL OPENING ENDOSCOPIC, DIAGNOSTIC: ICD-10-PCS | Performed by: INTERNAL MEDICINE

## 2021-12-09 PROCEDURE — 0DBM8ZX EXCISION OF DESCENDING COLON, VIA NATURAL OR ARTIFICIAL OPENING ENDOSCOPIC, DIAGNOSTIC: ICD-10-PCS | Performed by: INTERNAL MEDICINE

## 2021-12-09 PROCEDURE — 88305 TISSUE EXAM BY PATHOLOGIST: CPT | Performed by: INTERNAL MEDICINE

## 2021-12-09 PROCEDURE — 0DB48ZX EXCISION OF ESOPHAGOGASTRIC JUNCTION, VIA NATURAL OR ARTIFICIAL OPENING ENDOSCOPIC, DIAGNOSTIC: ICD-10-PCS | Performed by: INTERNAL MEDICINE

## 2021-12-09 PROCEDURE — 88312 SPECIAL STAINS GROUP 1: CPT | Performed by: INTERNAL MEDICINE

## 2021-12-09 PROCEDURE — 0DB68ZX EXCISION OF STOMACH, VIA NATURAL OR ARTIFICIAL OPENING ENDOSCOPIC, DIAGNOSTIC: ICD-10-PCS | Performed by: INTERNAL MEDICINE

## 2021-12-09 PROCEDURE — 3E0G8GC INTRODUCTION OF OTHER THERAPEUTIC SUBSTANCE INTO UPPER GI, VIA NATURAL OR ARTIFICIAL OPENING ENDOSCOPIC: ICD-10-PCS | Performed by: INTERNAL MEDICINE

## 2021-12-09 RX ORDER — LIDOCAINE HYDROCHLORIDE 10 MG/ML
INJECTION, SOLUTION EPIDURAL; INFILTRATION; INTRACAUDAL; PERINEURAL AS NEEDED
Status: DISCONTINUED | OUTPATIENT
Start: 2021-12-09 | End: 2021-12-09 | Stop reason: SURG

## 2021-12-09 RX ORDER — SODIUM CHLORIDE, SODIUM LACTATE, POTASSIUM CHLORIDE, CALCIUM CHLORIDE 600; 310; 30; 20 MG/100ML; MG/100ML; MG/100ML; MG/100ML
INJECTION, SOLUTION INTRAVENOUS CONTINUOUS
Status: DISCONTINUED | OUTPATIENT
Start: 2021-12-09 | End: 2021-12-09

## 2021-12-09 RX ADMIN — SODIUM CHLORIDE, SODIUM LACTATE, POTASSIUM CHLORIDE, CALCIUM CHLORIDE: 600; 310; 30; 20 INJECTION, SOLUTION INTRAVENOUS at 08:13:00

## 2021-12-09 RX ADMIN — SODIUM CHLORIDE, SODIUM LACTATE, POTASSIUM CHLORIDE, CALCIUM CHLORIDE: 600; 310; 30; 20 INJECTION, SOLUTION INTRAVENOUS at 09:02:00

## 2021-12-09 RX ADMIN — LIDOCAINE HYDROCHLORIDE 50 MG: 10 INJECTION, SOLUTION EPIDURAL; INFILTRATION; INTRACAUDAL; PERINEURAL at 08:15:00

## 2021-12-09 NOTE — OPERATIVE REPORT
ENDOSCOPY OPERATIVE REPORT  Patient Name: Federico Thompson Christ Record #: [id number]  YOB: 1960  Date of Procedure: 12/9/2021    Preoperative Diagnosis: history of gastric polyp with low grade dysplasia at last endoscopy in 10/2021 repositioned and prepared for the colonoscopy. The scope was inserted through the rectum and advanced to the cecum as identified by the appendiceal orifice and ileocecal valve.  The quality of the preparation was Aronchick score 3 (this was washed copiously were removed using cold biopsy forceps. Moderate Left sided and Mild Right sided diverticulosis. The overall visualized mucosal pattern of the colon was unremarkable.  At the anal verge the endoscope was retroverted, internal hemorrhoids, no other abnormal

## 2021-12-09 NOTE — ANESTHESIA POSTPROCEDURE EVALUATION
Patient: Delmy Loyd    Procedure Summary     Date: 12/09/21 Room / Location: [address] [place] [address] ENDOSCOPY 04 / [address] [place] [address] ENDOSCOPY    Anesthesia Start: 0813 Anesthesia Stop:     Procedures:       COLONOSCOPY/ESOPHAGOGASTRODUODENOSCOPY (EGD) (N/A )      ESOPHAGOGAS

## 2021-12-09 NOTE — ANESTHESIA PREPROCEDURE EVALUATION
Anesthesia PreOp Note    HPI:     Collette Freire is a 63year old female who presents for preoperative consultation requested by: Jackeline Diaz MD    Date of Surgery: 12/9/2021    Procedure(s):  COLONOSCOPY/ESOPHAGOGASTRODUODENOSCOPY (EGD)  ESOPHAG lateral epicondylitis         Date Noted: 10/27/2016      Ankle pain, left         Date Noted: 01/12/2016      Ankle fracture, left         Date Noted: 11/25/2015      Other and unspecified derangement of medial meniscus         Date Noted: 03/09/2015 the mid LAD. LV function.-intact    • Colon polyp 2012 (due 12/2015)    DR. Ruiz   • COPD (chronic obstructive pulmonary disease) ([facility] [address]     no home oxygen    • Diabetes ([facility] [address]    • GERD EGD (Sara)1/07 1/07 = EGD = schatzkis ring.    • HOSPITALIZATIONS 5/09 REPORT  2003    left pneumonectomy   • UP GI ENDOSCOPY,FARIDA ISIDRO,30MM  4/28/2011    Performed by GURVINDER URENA at [facility]   • UPPER GI ENDOSCOPY,BIOPSY  12/7/09    Performed by GURVINDER URENA at [facility]   • UPPER GI ENDOSCOPY,BI mouth., Disp: , Rfl:   PANTOPRAZOLE 40 MG Oral Tab EC, TAKE 1 TABLET BY MOUTH EVERY DAY, Disp: 90 tablet, Rfl: 1  MONTELUKAST 10 MG Oral Tab, TAKE 1 TABLET(10 MG) BY MOUTH EVERY NIGHT, Disp: 90 tablet, Rfl: 0  ondansetron 4 MG Oral Tablet Dispersible, Take History      Not on file    Tobacco Use      Smoking status: Former Smoker        Packs/day: 1.00        Years: 25.00        Pack years: 22        Quit date: 10/16/2003        Years since quittin.1      Smokeless tobacco: Never Used      Tobacco comme clear to auscultation  (+) pneumonia, COPD, asthma (Inhalers prn, last use a year ago), sleep apnea,     ROS comment: Per Pulm note 10/2021:   I had the pleasure of seeing Rosendo Reid for follow up of asthma, lung cancer 2003 s/p L pneumonectomy and r member of the nature of the anesthetic plan, benefits, risks including possible dental damage if relevant, major complications, and any alternative forms of anesthetic management. All of the patient's questions were answered to the best of my ability.  Nida Sandra

## 2021-12-09 NOTE — H&P
History & Physical Examination    Patient Name: Kae Gonzales  MRN: [id number]  CSN: [id number]  YOB: 1960    Diagnosis: gastric polyp with LGD; History of adenomatous colon polyps. Present Illness:  gastric polyp with LGD;  Histo prn  ATORVASTATIN 20 MG Oral Tab, TAKE 1 TABLET BY MOUTH EVERY DAY, Disp: 90 tablet, Rfl: 0, 12/8/2021  Levocetirizine Dihydrochloride 5 MG Oral Tab, Take 1 tablet (5 mg total) by mouth daily. , Disp: 90 tablet, Rfl: 3, 12/8/2021  hydrochlorothiazide 12.5 M dyspnea- echo neg, stress neg, no pe, add advair, question gerd   • lung cancer 09/2003- see birth hx    Lung CA- neg pet  10/2010, RLL nodule unchanged from 09- benign   • Lyme disease 8/19/2018   • Malignant neoplasm of bronchus and lung, unspecified sit 8/8/2014    Procedure: ESOPHAGOGASTRODUODENOSCOPY, POSSIBLE BIOPSY, POSSIBLE POLYPECTOMY 99225;  Surgeon: Crystal Vickers MD;  Location: [address] [place] [address]   • UPPER GI ENDOSCOPY,EXAM       Family History   Problem Relation Age of Onset   • Cancer Mater

## 2021-12-17 NOTE — PROGRESS NOTES
Pt notified of biopsy results. RECOMMEND REPEAT EGD in THREE MONTHS with MAC (monitored anesthesia care) AT [facility]. RECOMMEND REPEAT COLONOSCOPY IN 3 YEARS with MAC (monitored anesthesia care) and Two day preparation.

## 2022-05-19 NOTE — OPERATIVE REPORT
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 Last EGD was in 12/2021. Postoperative Diagnosis:    1.) Prior polypectomy site in gastric body identified with one (of two) clip still attached to the mucosa. The tattoo previously placed was not identified. No overt endoscopic evidence of residual polyp. Numerous biopsies were obtained and the old clip removed by biopsying the polypectomy base. One clip deployed at the site as prophylaxis. 2.) Minimal non-obstructing Schatzki's ring - broken with biopsy forceps. 3.) Minimal patchy gastritis. 4.) 3 - 4 mm x 8 benign appearing fundic gland polyps in stomach body and fundus -- biopsies obtained. Procedure Performed: Esophagogastroduodenoscopy with biopsy. Anesthesia Given: MAC. ASA: 3. Moderate sedation time: NA. Deep sedation was provided by anesthesiologist.   Endoscopist: Jim Gilmore MD  Procedure:  After the patient was interviewed and the procedure again discussed and questions addressed, the patient was brought to the GI Lab and monitoring of the B/P, pulse, and pulse oximetry was performed. A time-out procedure was performed, history and physical were reviewed, medical reconciliation was complete, informed consent was obtained. The patient was then placed in the left lateral decubitus position and sedated with divided doses of IV medication; continuous vital signs were monitored throughout the procedure. The Olympus videogastroscope was intubated into the esophagus and advanced into the 2nd part of duodenum under directed vision without difficulty. Then withdrawn. A thorough exam was performed. The patient tolerated the procedure well.    FINDINGS: The esophagus mucosa had an overall normal appearance except for a transient minimal non-obstructing Schatzki's ring. The gastric lumen was then entered and views of the gastric mucosa as well as retroverted views of the fundus. Minimal patchy gastritis was noted in antrum. Prior polypectomy site was identified as one of the clips placed previously was still attached to the mucosa. No overt residual polyp appreciated. Numerous biopsies from this site were obtained; the prior clip was dislodged. No evidence of residual polyp / granulation tissue after biopsies today. One clip was deployed at this site as prophylaxis. Several other benign fundic polyps were noted as above. Sample biopsies obtained. The Schatzki's ring was broken with biopsy forceps. A normal pylorus was passed and the duodenal bulb entered, the duodenal bulb and post-bulbar duodenum were unremarkable. The procedure was tolerated well and upon completion and throughtout the vital signs were stable. COMPLICATIONS: None. PLAN: Await biopsy results. The patient and  were informed of the endoscopic findings and was also given a copy of the findings, postoperative instructions, and postoperative precautions. Garth Sandra MD  [facility].

## 2022-11-08 NOTE — PAT NURSING NOTE
Patient tested COVID + 9/23/22 at a [facility]. RN unable to see results on MyChart.   Pt will show documentation when she arrives at hospital.

## 2022-11-14 NOTE — ANESTHESIA POSTPROCEDURE EVALUATION
Patient: Be Beltre    Procedure Summary     Date: 11/14/22 Room / Location: [address] [place] [address] ENDOSCOPY 05 / [address] [place] [address] ENDOSCOPY    Anesthesia Start: 2092 Anesthesia Stop:     Procedure: ESOPHAGOGASTRODUODENOSCOPY (EGD) Diagnosis:       Acute gastritis without hemorrhage, unspecified gastritis type      (schatzki ring, hiatal hernia, gastric polyps )    Surgeons: Jonathan Castle MD Anesthesiologist: Tin Pavon CRNA    Anesthesia Type: MAC ASA Status: 3          Anesthesia Type: MAC    Vitals Value Taken Time   BP 99/56 11/14/22 1410   Temp 98.6 11/14/22 1410   Pulse 86 11/14/22 1410   Resp 21 11/14/22 1410   SpO2 98 11/14/22 1410       [facility] AN Post Evaluation:   Patient Evaluated in PACU  Patient Participation: complete - patient participated  Level of Consciousness: awake and alert  Pain Score: 0  Pain Management: adequate  Airway Patency:patent  Dental exam unchanged from preop  Yes    Cardiovascular Status: acceptable  Respiratory Status: acceptable  Postoperative Hydration acceptable      [address], CRNA  11/14/2022 2:10 PM

## 2022-11-14 NOTE — DISCHARGE INSTRUCTIONS
Home Care Instructions for Gastroscopy with Sedation    Diet:  - Resume your regular diet as tolerated unless otherwise instructed. - Start with light meals to minimize bloating.  - Do not drink alcohol today. Medication:  - If you have questions about resuming your normal medications, please contact your Primary Care Physician. Activities:  - Take it easy today. Do not return to work today. - Do not drive today. - Do not operate any machinery today (including kitchen equipment). Gastroscopy:  - You may have a sore throat for 2-3 days following the exam. This is normal. Gargling with warm salt water (1/2 tsp salt to 1 glass warm water) or using throat lozenges will help. - If you experience any sharp pain in your neck, abdomen or chest, vomiting of blood, oral temperature over 100 degrees Fahrenheit, light-headedness or dizziness, or any other problems, contact your doctor. **If unable to reach your doctor, please go to the [facility] AND [facility] Emergency Room**    - Your referring physician will receive a full report of your examination.  - If you do not hear from your doctor's office within two weeks of your biopsy, please call them for your results. You may be able to see your laboratory results in [place] between 4 and 7 business days. In some cases, your physician may not have viewed the results before they are released to [address] If you have questions regarding your results contact the physician who ordered the test/exam by phone or via [address] by choosing \"Ask a Medical Question. \"

## 2022-11-14 NOTE — OPERATIVE REPORT
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 Last EGD 5/2022. Postoperative Diagnosis:    1.) Prior polypectomy site identified (one clip previously placed was still attached to the mucosa). No evidence of residual or recurrent polyp. Biopsies from this site were obtained. 2.) 3 - 4 mm x 4 stomach body polyps -- biopsies obtained. 3.) Small 2 cm hiatal hernia and a non-obstructing Schatzki's ring re-demonstrated -- ring broken circumferentially with biopsy forceps. Procedure Performed: Esophagogastroduodenoscopy with biopsy. Anesthesia Given: MAC. ASA: 3. Moderate sedation time: NA. Deep sedation was provided by anesthesiologist.   Endoscopist: Leeann Cabello MD  Procedure:  After the patient was interviewed and the procedure again discussed and questions addressed, the patient was brought to the GI Lab and monitoring of the B/P, pulse, and pulse oximetry was performed. A time-out procedure was performed, history and physical were reviewed, medical reconciliation was complete, informed consent was obtained. The patient was then placed in the left lateral decubitus position and sedated with divided doses of IV medication; continuous vital signs were monitored throughout the procedure. The Olympus videogastroscope was intubated into the esophagus and advanced into the 2nd part of duodenum under directed vision without difficulty. Then withdrawn. A thorough exam was performed. The patient tolerated the procedure well. FINDINGS: The esophagus mucosa had an overall normal appearance except for a non-obstructing Schatzki's ring as seen previously. The gastric lumen was then entered and views of the gastric mucosa as well as retroverted views of the fundus which suggested a small 2 cm hiatal hernia.  A clip was identified at prior polypectomy site. There was no evidence of residual or recurrent polyp at this site. biopsies from this site were obtained. Four small benign appearing gastric body polyps were noted -- biopsies obtained. A normal pylorus was passed and the duodenal bulb entered, the duodenal bulb and post-bulbar duodenum were unremarkable. The Schatzki's ring was broken circumferentially with a biopsy forceps. The procedure was tolerated well and upon completion and throughtout the vital signs were stable. COMPLICATIONS: None. PLAN: Await biopsy results. The patient and  were informed of the endoscopic findings and was also given a copy of the findings, postoperative instructions, and postoperative precautions. Mikayla Okeefe MD  [facility].

## 2022-12-15 NOTE — TELEPHONE ENCOUNTER
Patient is in the supine position.   Procedure performed on a bed/cart.The patient was positioned by Davon La Requesting Trokendi ER 25mg  LOV: 5/9/17  RTC: 4 weeks  Last Labs: n/a  Filled: 5/9/17 #30 with 1 refills sent to [facility] in [address] [place] [address]  Date Time Provider Jeff Benton   6/12/2017 3:20 PM Joel Gunderson MD [facility]

## 2023-02-17 NOTE — BRIEF OP NOTE
Pre-Operative Diagnosis: Facet cyst     Post-Operative Diagnosis: Facet cyst      Procedure Performed:   Lumbar 4-lumbar 5 left facet cyst removal    Surgeon(s) and Role:     Felicia Oliva MD - Primary    Assistant(s):   Forrest Durant PA-C     Surgical Findings:      Specimen:      Estimated Blood Loss: No data recorded    Dictation Number:      Deepthi Byrnes  2/17/2023  4:09 PM

## 2023-02-17 NOTE — OPERATIVE REPORT
Operative Report  Patient Name:  Chun Hallman  Date: 2/17/2023  Preoperative Diagnosis: L4-5 facet cyst left  Postoperative Diagnosis: Same  Primary Surgeon: Jorge Betts MD  Assistant: Satya Whitmore  Procedures:   L4- and L5 Laminoforaminotomy     CPT 35065  L4-5 Excision of Facet Cyst      CPT 73943    Surgical Findings: Subarticular/Lateral Recess Stenosis  Anesthesia: General Endotracheal Anesthesia  Estimated Blood Loss: 20cc  Drains: none  Implants: None  Specimen: None  Condition: Stable  Complications: None      Surgical Indications:  Chun Hallman is an 66year old female who presented with a history of lower extremity left pain, numbness, and weakness, with symptoms refractory to nonsurgical care. The option of surgery was discussed with the patient. Risks, benefits, and alternatives of surgery were discussed with the patient, which include but are not limited to bleeding, infection, DVT/PE, dural tears, neurologic injury, worsening of neurological status, risk of instability, need for subsequent surgery, risks associated with anesthesia, including death, which were all reviewed with the patient and she consented to proceed with surgery. Surgical Procedure: The patient was met in the preop holding area, we reviewed elements of the patient's history and physical examination along with the planned surgical procedure. The patient was in agreement. The patient was administered prophylactic antibiotics. After successful induction of general endotracheal anesthesia, the patient was then placed in the prone position on a omid frame, hips and knees flexed. All bony prominences were carefully padded, timeout was performed identifying the patient by name, MRN, and date of birth; the nature of the procedure, surgical site, and concerns were addressed with the operating room staff. All were in agreement and we proceeded with the procedure.                  Using a spinal needle a fluoroscopic image  was taken to localize the skin incision relative to the patient's anatomy. The skin was incised with a scalpel and subperiosteal dissection was carried out with electrocautery exposing the L4-5 interlaminar space. Curved curette was placed in the interlaminar space and fluoroscopic image confirmed appropriate level. A partial laminotomy was performed on the left side at L4 until the top of the ligamentum flavum was exposed. Ligamentum was then released with a curved curette and then removed . A partial medial facetectomy was performed out to the medial wall of the L4  pedicle. The ligamentum flavum was removed en bloc. A escalera ball could be passed around the thecal sac, root, and out to the neural foramen without any resistance. At the completion of the procedure the left L4 and L5 nerve root and dural sac was freely mobile. All bleeders were controlled using bipolar electrocautery, floseal and cottonoids. There was no active bleeding. A Hemovac drain was placed through a separate stab incision prior to closure of the fascia. 100mg of vancomycin powder was placed in the wound. Closure was done in layers with interrupted 0 Vicryl for the fascia, 2-0 Vicryl for the subcutaneous tissue. The subcutaneous layer was injected with 0.375% Marcaine and the skin was closed with a Monocryl suture. Dermabond and a sterile dressing were then applied. The patient was woken from anesthesia and transferred to the PACU in stable condition.                   A physician assistant was necessary during the case to provide positioning, exposure, hemostasis, safety and retraction and to manage wound suction so that I could operate with two hands under the surgical Yash Burrell MD  Division of Spine Surgery  [address] [place] [address]

## 2023-02-17 NOTE — ANESTHESIA PROCEDURE NOTES
Airway  Date/Time: 2/17/2023 2:55 PM  Urgency: elective    Airway not difficult    General Information and Staff    Patient location during procedure: OR  Anesthesiologist: Jos Ramon MD  Performed: anesthesiologist   Performed by: Jos Ramon MD  Authorized by: Jos Ramon MD      Indications and Patient Condition  Indications for airway management: anesthesia  Spontaneous Ventilation: absent  Sedation level: deep  Preoxygenated: yes  Patient position: sniffing  Mask difficulty assessment: 1 - vent by mask  Planned trial extubation    Final Airway Details  Final airway type: endotracheal airway      Successful airway: ETT  Cuffed: yes   Successful intubation technique: direct laryngoscopy  Facilitating devices/methods: intubating stylet and cricoid pressure  Endotracheal tube insertion site: oral  Blade size: #3  ETT size (mm): 7.0    Cormack-Lehane Classification: grade I - full view of glottis  Placement verified by: chest auscultation and capnometry   Measured from: teeth  ETT to teeth (cm): 21  Number of attempts at approach: 1  Number of other approaches attempted: 0

## 2023-12-09 NOTE — ED INITIAL ASSESSMENT (HPI)
Patient c/o cough, fever, body aches x3-4 days. Seen at [address] [place] [address] but sent in for workup due to hx of lung CA and patient only has one lung.

## 2024-10-23 NOTE — ED INITIAL ASSESSMENT (HPI)
Pt here with  due to right knee pain. Pt was seen at urgent care and started on tramadol and a steroid on Saturday and has not improved. Pt states that she does not know what happened but pt is a two times a week bowler. No injury that she knows of.

## 2024-10-24 NOTE — ED PROVIDER NOTES
Patient Seen in: [facility] Emergency Department      History     Chief Complaint   Patient presents with    Pain     Stated Complaint: right knee pain, was seen at urgent care and started on tramadol and a steroid *    Subjective:   HPI      64-year-old female presents to the emergency department complaining of right knee pain which started a week ago with some discomfort on the anterior knee, now progressed to global knee discomfort with any movement, 10/10 in severity.  Pain is in the right knee only.  She denies any known injury.  She was seen at a walk-in clinic 4 days ago her x-rays were normal and she was placed on a Medrol Dosepak and tramadol.  Symptoms have not improved.  She is having difficulty bearing weight.  She denies any other physical complaints.  No fever.  Despite being warned not to use ibuprofen she has been using it in addition to the Medrol Dosepak although has no GI complaints at this time.    Objective:     Past Medical History:    Acquired hypothyroidism    Allergic rhinitis, cause unspecified    Arrhythmia    history A-fib post left lung surgery; states no longer, sees cardiology yearly    Asthma ([facility])    last inhaler use few weeks ago    Back problem    CAD (coronary artery disease)    [facility]- mild to moderate coronary disease as described. Bridging in the mid LAD. LV function.-intact     Colon polyp    DR. Ruiz    COPD (chronic obstructive pulmonary disease) ([facility])    no home oxygen     Coronary atherosclerosis    Diabetes ([facility])    Disorder of thyroid    Esophageal reflux    GERD    1/07 = EGD = schatzkis ring.    High cholesterol    HOSPITALIZATIONS    hosp for dyspnea- echo neg, stress neg, no pe, add advair, question gerd    lung cancer    Lung CA- neg pet  10/2010, RLL nodule unchanged from 09- benign    Lyme disease    Malignant neoplasm of bronchus and lung, unspecified site    Mild depression    Mixed hyperlipidemia due to type 2 diabetes mellitus (HCC)    Nontoxic multinodular  goiter    few nodule left- due in aug 2012 repeat u/s    PONV (postoperative nausea and vomiting)    Sleep apnea    CPAP              Past Surgical History:   Procedure Laterality Date    Arthroscopy of joint unlisted      knee    Colonoscopy  07/09/12 (2015)    11/07-tub adenoma(2009)-> 12/09 hyperplastic polyp (2012) - 2012 small polyp    Colonoscopy      Colonoscopy N/A 12/09/2021    Procedure: COLONOSCOPY/ESOPHAGOGASTRODUODENOSCOPY (EGD);  Surgeon: Real Ruiz MD;  Location: [facility] ENDOSCOPY    Colonoscopy,biopsy  12/07/2009    Performed by REAL RUIZ at [facility]    Colonoscopy,biopsy  12/11/2012    1.) 3 mm rectal polyp = removed.    Egd N/A 10/11/2021    Procedure: ESOPHAGOGASTRODUODENOSCOPY with bxs COLONOSCOPY;  Surgeon: Real Ruiz MD;  Location: [facility]    Endometr ablate, thermal  2007    Hysterectomy  7/09- fibroids    laporoscopy hys (partial), ovaries intact, fibroids removed    Knee scope,med/lat menisectomy Left 03/19/2015    Procedure: ARTHROSCOPY KNEE LEFT;  Surgeon: Ynoy Sneed MD;  Location: [facility]    Laparoscopy,diagnostic      infertlity    Other surgical history  2005    Vocal cord repair    Other surgical history  10/16/2003    Thoracotomy - L side    Other surgical history Left 11/24/2015    Procedure: ANKLE OPEN REDUCTION INTERNAL FIXATION;  Surgeon: Shabbir Suero MD;  Location:  MAIN OR    Removal of lung,lobectomy      Special service or report  2003    left pneumonectomy    Up gi endoscopy,ball dil,30mm  04/28/2011    Performed by REAL RUIZ at [facility]    Upper gi endoscopy,biopsy  12/07/2009    Performed by REAL RUIZ at [facility]    Upper gi endoscopy,biopsy N/A 08/08/2014    Procedure: ESOPHAGOGASTRODUODENOSCOPY, POSSIBLE BIOPSY, POSSIBLE POLYPECTOMY 13596;  Surgeon: Real Ruiz MD;  Location: [facility]    Upper gi endoscopy,exam                  Social History     Socioeconomic  History    Marital status:    Tobacco Use    Smoking status: Former     Current packs/day: 0.00     Average packs/day: 1 pack/day for 25.0 years (25.0 ttl pk-yrs)     Types: Cigarettes     Start date: 10/16/1978     Quit date: 10/16/2003     Years since quittin.0    Smokeless tobacco: Never    Tobacco comments:     25   Vaping Use    Vaping status: Never Used   Substance and Sexual Activity    Alcohol use: Yes     Alcohol/week: 0.0 standard drinks of alcohol     Comment: rare    Drug use: Yes     Types: Cannabis     Comment: Gummies/not since     Sexual activity: Not Currently     Partners: Male     Birth control/protection: Hysterectomy     Comment: , together sicne                   Physical Exam     ED Triage Vitals [10/23/24 1844]   BP (!) 179/94   Pulse 84   Resp 14   Temp 97.9 °F (36.6 °C)   Temp src Oral   SpO2 96 %   O2 Device None (Room air)       Current Vitals:   Vital Signs  BP: 128/61  Pulse: 75  Resp: 18  Temp: 97.9 °F (36.6 °C)  Temp src: Oral  MAP (mmHg): 82    Oxygen Therapy  SpO2: 98 %  O2 Device: None (Room air)        Physical Exam     General Appearance: This is a middle-aged female sitting on a gurney.  Vital signs were reviewed per nurses notes.  Patient is afebrile.  HEENT: Normocephalic/atraumatic.  Anicteric sclera.  Oral mucosa is moist.  Neck: No adenopathy or thyromegaly.  Lungs are clear to auscultation.  Heart exam: Normal S1-S2 without extra sounds or murmurs.  Regular rate and rhythm.  Right lower extremity: Dorsal pedal pulses present.  Capillary refill to the digits is brisk.  No warmth or erythema.  No effusion.  Some tenderness over the patella without soft tissue swelling.  Marked decreased active range of motion secondary to pain.  Able to actively extend the leg.  Remainder the extremity is atraumatic.  ED Course   Labs Reviewed - No data to display         Oral Norco was given for pain.    MRI KNEE, RIGHT (RDH=87625)    Result Date:  10/23/2024  PROCEDURE:  MRI KNEE, RIGHT (CPT=73721)  COMPARISON:  None.  INDICATIONS:  right knee pain, was seen at urgent care and started on tramadol and a steroid on Saturday and has not improved. Severe pain with inability to walk.  TECHNIQUE:  Axial, coronal, and sagittal proton density with and without fat saturation images were obtained.  PATIENT STATED HISTORY: (As transcribed by Technologist)  Ongoing right knee pain    FINDINGS:  LIGAMENTS:          The ACL, PCL, patellar retinacula, and collateral ligament complexes are intact. MENISCI:            The medial and lateral menisci are intact without evidence of tear or significant degenerative change. TENDONS:            The tendinous insertions about the knee are intact without significant tendinosis or tears. MUSCULATURE:        No evidence of strain, edema, or atrophy. BONY COMPARTMENTS:  Marked irregularity in the patellar cartilage with subchondral T2 changes.  Osteochondral defect in the lateral femoral condyle measuring 1.3 x 0.8 cm. SYNOVIUM:           Small joint effusion.             CONCLUSION:   1. Major ligaments and tendons are intact.  The medial lateral menisci are intact.  2. Large osteochondral defect in the anterior lateral femoral condyle measuring 1.3 x 0.8 cm.  Marked patellar cartilage irregularity is noted.  Subchondral T2 changes are noted.    LOCATION:  [facility]          Dictated by (CST): Higinio North MD on 10/23/2024 at 10:47 PM     Finalized by (CST): Higinio North MD on 10/23/2024 at 10:50 PM       I personally reviewed the images myself and went over results with patient.    I viewed the MRI films of the right knee myself.  There is irregularity of the patellar cartilage as well as a defect on the anterior lateral femoral condyle.    Ace wrap and walker were dispensed.  Test results and treatment plan were discussed.  Patient has follow-up with orthopedics scheduled in 1 week.       MDM      #1.  Acute right knee pain.   MRI findings consistent with cartilaginous abnormalities within the joint that are the likely etiology of the patient's symptoms.  No ligamentous disruption.        Medical Decision Making      Disposition and Plan     Clinical Impression:  1. Acute pain of right knee         Disposition:  Discharge  10/23/2024 11:25 pm    Follow-up:  Ronald Mejia MD  [address]  [facility] [address]  [facility] [address]  [phone]    Call in 1 day(s)  Anyone in the office.          Medications Prescribed:  Discharge Medication List as of 10/23/2024 11:35 PM        START taking these medications    Details   HYDROcodone-acetaminophen 5-325 MG Oral Tab Take 1-2 tablets by mouth every 6 (six) hours as needed for Pain., Normal, Disp-20 tablet, R-0                 Supplementary Documentation:

## 2024-10-24 NOTE — DISCHARGE INSTRUCTIONS
Discontinue prednisone.    Continue ibuprofen 600 to 800 mg every 8 hours as needed for pain.    Weightbearing as tolerated.

## 2025-03-03 NOTE — ANESTHESIA POSTPROCEDURE EVALUATION
Patient: Mason Zamarripa    Procedure Summary       Date: 03/03/25 Room / Location: [facility] ENDOSCOPY 05 / [facility] ENDOSCOPY    Anesthesia Start: 0926 Anesthesia Stop: 1035    Procedures:       ESOPHAGOGASTRODUODENOSCOPY (EGD)      COLONOSCOPY Diagnosis:       History of adenomatous polyp of colon      Constipation, unspecified constipation type      Gastric polyposis      (hiatal hernia, gastric polyp, schatzki's ring, colon polyps, hemorrhoids, diverticulosis)    Surgeons: Real Ruiz MD Anesthesiologist: Ayana Agosto CRNA    Anesthesia Type: MAC ASA Status: 3            Anesthesia Type: No value filed.    Vitals Value Taken Time   BP 91/59 03/03/25 1030   Temp 97 03/03/25 1035   Pulse 94 03/03/25 1034   Resp 16 03/03/25 1034   SpO2 98 % 03/03/25 1034   Vitals shown include unfiled device data.    [facility] AN Post Evaluation:   Patient Evaluated in PACU  Patient Participation: complete - patient participated  Level of Consciousness: awake and alert  Pain Score: 0  Pain Management: adequate  Airway Patency:patent  Yes    Nausea/Vomiting: none  Cardiovascular Status: acceptable  Respiratory Status: acceptable  Postoperative Hydration acceptable      Ayana Agosto CRNA  3/3/2025 10:35 AM  
negative...

## 2025-03-03 NOTE — ANESTHESIA PREPROCEDURE EVALUATION
Anesthesia PreOp Note    HPI:     Mason Zamarripa is a 65 year old female who presents for preoperative consultation requested by: Real Ruiz MD    Date of Surgery: 3/3/2025    Procedure(s):  COLONOSCOPY / ESOPHAGOGASTRODUODENOSCOPY  ESOPHAGOGASTRODUODENOSCOPY (EGD)  Indication: History of adenomatous polyp of colon / Constipation, unspecified constipation type / Gastric polyposis    Relevant Problems   No relevant active problems       NPO:  Last Liquid Consumption Date: 03/03/25  Last Liquid Consumption Time: 2200  Last Solid Consumption Date: 03/01/25  Last Solid Consumption Time: 2000  Last Liquid Consumption Date: 03/03/25          History Review:  Patient Active Problem List    Diagnosis Date Noted    Mixed hyperlipidemia due to type 2 diabetes mellitus (HCC) 03/25/2021    Severe obesity (BMI 35.0-39.9) with comorbidity (HCC) 03/25/2021    Pneumonia due to COVID-19 virus 11/19/2020    Abdominal aortic atherosclerosis 11/12/2020    Acquired hypothyroidism 08/13/2020    Secondary hypercoagulable state (HCC) 07/23/2020    Lyme disease 08/19/2018    Chronic left-sided low back pain with left-sided sciatica 05/10/2018    OAB (overactive bladder) 02/27/2018    Urge incontinence 02/27/2018    Cervical radiculitis 12/29/2017    Coronary artery disease involving native coronary artery of native heart without angina pectoris 11/02/2017    Gastroesophageal reflux disease with esophagitis 05/09/2017    Internal derangement of knee, left 03/10/2017    Internal derangement of knee, right 02/20/2017    DULCE MARIA on CPAP 11/22/2016    Right lateral epicondylitis 10/27/2016    Ankle pain, left 01/12/2016    Ankle fracture, left 11/25/2015    Other and unspecified derangement of medial meniscus 03/09/2015    Prediabetes 10/08/2014    Hirsutism 10/08/2014    Weight gain 10/08/2014    Nontoxic multinodular goiter 10/08/2014    Lumbosacral spondylosis without myelopathy 03/07/2014    Sciatica 02/27/2014    Plantar fascial  fibromatosis 02/24/2014    Other seborrheic keratosis 01/16/2014    Solar lentiginosis 01/16/2014    Benign neoplasm of skin of back 01/16/2014    Benign neoplasm of skin of upper limb, including shoulder 01/16/2014    Hand eczema 01/16/2014    Xerosis cutis 01/16/2014    Maxillary sinus mass 09/26/2013    Colon polyp 04/11/2013    Diverticulosis 04/11/2013    Back pain 06/19/2012    Thyroid nodule     History of lung cancer 12/08/2011    Emphysema of lung (HCC) 10/22/2010    Allergic rhinitis, cause unspecified 01/21/2010    Asthma with COPD ([facility]) 11/17/2009    Atrial fibrillation ([facility]) 12/08/2008       Past Medical History:    Acquired hypothyroidism    Allergic rhinitis, cause unspecified    Arrhythmia    history A-fib post left lung surgery; states no longer, sees cardiology yearly    Asthma ([facility])    last inhaler use few weeks ago    Back problem    CAD (coronary artery disease)    [facility]- mild to moderate coronary disease as described. Bridging in the mid LAD. LV function.-intact     Colon polyp    DR. Ruiz    COPD (chronic obstructive pulmonary disease) ([facility])    no home oxygen     Coronary atherosclerosis    Disorder of thyroid    Esophageal reflux    GERD    1/07 = EGD = schatzkis ring.    High cholesterol    HOSPITALIZATIONS    hosp for dyspnea- echo neg, stress neg, no pe, add advair, question gerd    lung cancer    Lung CA- neg pet  10/2010, RLL nodule unchanged from 09- benign    Lyme disease    Malignant neoplasm of bronchus and lung, unspecified site    Mild depression    Mixed hyperlipidemia due to type 2 diabetes mellitus (HCC)    Nontoxic multinodular goiter    few nodule left- due in aug 2012 repeat u/s    PONV (postoperative nausea and vomiting)    Prediabetes    Sleep apnea    CPAP    Visual impairment    reading glasses       Past Surgical History:   Procedure Laterality Date    Arthroscopy of joint unlisted      knee    Colonoscopy  07/09/12 (2015)    11/07-tub adenoma(2009)-> 12/09 hyperplastic  polyp (2012) - 2012 small polyp    Colonoscopy      Colonoscopy N/A 12/09/2021    Procedure: COLONOSCOPY/ESOPHAGOGASTRODUODENOSCOPY (EGD);  Surgeon: Real Ruiz MD;  Location: [facility] ENDOSCOPY    Colonoscopy,biopsy  12/07/2009    Performed by REAL RUIZ at [facility]    Colonoscopy,biopsy  12/11/2012    1.) 3 mm rectal polyp = removed.    Egd N/A 10/11/2021    Procedure: ESOPHAGOGASTRODUODENOSCOPY with bxs COLONOSCOPY;  Surgeon: Real Ruiz MD;  Location: [facility]    Endometr ablate, thermal  2007    Hysterectomy  7/09- fibroids    laporoscopy hys (partial), ovaries intact, fibroids removed    Knee scope,med/lat menisectomy Left 03/19/2015    Procedure: ARTHROSCOPY KNEE LEFT;  Surgeon: Yony Sneed MD;  Location: [facility]    Laparoscopy,diagnostic      infertlity    Other surgical history  2005    Vocal cord repair    Other surgical history  10/16/2003    Thoracotomy - L side    Other surgical history Left 11/24/2015    Procedure: ANKLE OPEN REDUCTION INTERNAL FIXATION;  Surgeon: Shabbir Suero MD;  Location:  MAIN OR    Removal of lung,lobectomy      Special service or report  2003    left pneumonectomy    Up gi endoscopy,ball dil,30mm  04/28/2011    Performed by REAL RUIZ at [facility]    Upper gi endoscopy,biopsy  12/07/2009    Performed by REAL RUIZ at [facility]    Upper gi endoscopy,biopsy N/A 08/08/2014    Procedure: ESOPHAGOGASTRODUODENOSCOPY, POSSIBLE BIOPSY, POSSIBLE POLYPECTOMY 26524;  Surgeon: Real Ruiz MD;  Location: [facility]    Upper gi endoscopy,exam         Prescriptions Prior to Admission[1]  Current Medications and Prescriptions Ordered in Epic[2]    Allergies[3]    Family History   Problem Relation Age of Onset    Cancer Maternal Grandmother         gall bladder    Diabetes Paternal Grandmother     Heart Disorder Brother         MI, required cardioversion     Gastro-Intestinal Disorder Father          diverticulitis    Stroke Paternal Grandfather      Social History     Socioeconomic History    Marital status:    Tobacco Use    Smoking status: Former     Current packs/day: 0.00     Average packs/day: 1 pack/day for 25.0 years (25.0 ttl pk-yrs)     Types: Cigarettes     Start date: 10/16/1978     Quit date: 10/16/2003     Years since quittin.3    Smokeless tobacco: Never    Tobacco comments:     25   Vaping Use    Vaping status: Never Used   Substance and Sexual Activity    Alcohol use: Yes     Alcohol/week: 0.0 standard drinks of alcohol     Comment: rare    Drug use: Yes     Types: Cannabis     Comment: Gummies/not since     Sexual activity: Not Currently     Partners: Male     Birth control/protection: Hysterectomy     Comment: , together sicne        Available pre-op labs reviewed.             Vital Signs:  Body mass index is 36.56 kg/m².   height is 1.626 m (5' 4\") and weight is 96.6 kg (213 lb). Her blood pressure is 124/83 and her pulse is 108. Her respiration is 18 and oxygen saturation is 97%.   Vitals:    25 1147 25 0834   BP:  124/83   Pulse:  108   Resp:  18   SpO2:  97%   Weight: 96.6 kg (213 lb)    Height: 1.626 m (5' 4\")         Anesthesia Evaluation     Patient summary reviewed and Nursing notes reviewed    History of anesthetic complications   Airway   Mallampati: II  TM distance: >3 FB  Neck ROM: full  Dental - Dentition appears grossly intact     Pulmonary - normal exam    breath sounds clear to auscultation  (+) COPD moderate, asthma, sleep apnea on CPAP  Cardiovascular   (+) CAD, dysrhythmias    Rhythm: irregular  Rate: normal    Neuro/Psych    (+)  neuromuscular disease,        GI/Hepatic/Renal    (+) GERD well controlled, bowel prep    Endo/Other    (+) diabetes mellitus type 2 well controlled, hypothyroidism  Abdominal  - normal exam  (+) obese                 Anesthesia Plan:   ASA:  3  Plan:   MAC  Post-op Pain Management: IV analgesics  Informed  Consent Plan and Risks Discussed With:  Patient  Use of Blood Products Discussed With:  Patient  Discussed plan with:  CRNA and surgeon      I have informed Mason Zamarripa and/or legal guardian or family member of the nature of the anesthetic plan, benefits, risks including possible dental damage if relevant, major complications, and any alternative forms of anesthetic management.   All of the patient's questions were answered to the best of my ability. The patient desires the anesthetic management as planned.  Ayana Agosto CRNA  3/3/2025 8:35 AM  Present on Admission:  **None**           [1]   Medications Prior to Admission   Medication Sig Dispense Refill Last Dose/Taking    semaglutide 2 MG/3ML Subcutaneous Solution Pen-injector Inject 0.5 mg into the skin once a week.   2/20/2025    ezetimibe 10 MG Oral Tab Take 1 tablet (10 mg total) by mouth daily.   3/2/2025    rosuvastatin 10 MG Oral Tab Take 1 tablet (10 mg total) by mouth daily.   3/2/2025    Melatonin 1 MG Oral Cap Take 1 capsule (1 mg total) by mouth nightly as needed.   Taking As Needed    MONTELUKAST 10 MG Oral Tab TAKE 1 TABLET(10 MG) BY MOUTH EVERY NIGHT 90 tablet 1 3/2/2025    pantoprazole 40 MG Oral Tab EC Take 1 tablet (40 mg total) by mouth 2 (two) times daily before meals. 180 tablet 1 3/3/2025 Morning    LEVOTHYROXINE 50 MCG Oral Tab TAKE 1 TABLET BY MOUTH EVERY MORNING BEFORE BREAKFAST 90 tablet 0 3/3/2025 Morning    Levocetirizine Dihydrochloride 5 MG Oral Tab Take 1 tablet (5 mg total) by mouth daily. 90 tablet 3 3/2/2025    Albuterol Sulfate  (90 Base) MCG/ACT Inhalation Aero Soln Inhale 2 puffs into the lungs every 4 (four) hours as needed for Wheezing. 1 Inhaler 0 Taking As Needed    aspirin 81 MG Oral Tab Take 1 tablet (81 mg total) by mouth daily.   2/25/2025    ondansetron 4 MG Oral Tablet Dispersible Take 1 tablet (4 mg total) by mouth every 8 (eight) hours as needed for Nausea. (Patient not taking: Reported on 2/26/2025) 20  tablet 0 Not Taking    ibuprofen 800 MG Oral Tab ibuprofen 800 mg tablet   TAKE 1 TABLET BY MOUTH EVERY 6 HOURS AS NEEDED FOR PAIN (Patient not taking: Reported on 3/3/2025)   Not Taking    Levalbuterol HCl 0.63 MG/3ML Inhalation Nebu Soln Take 3 mL (0.63 mg total) by nebulization every 4 (four) hours as needed for Wheezing. 20 mL 0     FLUTICASONE PROPIONATE 50 MCG/ACT Nasal Suspension SHAKE LIQUID AND USE 1 SPRAY IN EACH NOSTRIL DAILY (Patient not taking: Reported on 2/26/2025) 16 g 2 Not Taking    Multiple Vitamins-Minerals (MULTIVITAMIN OR) Take  by mouth.   2/27/2025   [2]   Current Facility-Administered Medications Ordered in Epic   Medication Dose Route Frequency Provider Last Rate Last Admin    lactated ringers infusion   Intravenous Continuous Real Ruiz MD 20 mL/hr at 03/03/25 0835 New Bag at 03/03/25 0835     No current [facility]-ordered outpatient medications on file.   [3]   Allergies  Allergen Reactions    Amoxicillin ITCHING    Diclofenac SHORTNESS OF BREATH     dyspepsia    Morphine Sulfate      BLOOD PRESURE DROPS    Opioid Analgesics NAUSEA AND VOMITING    Augmentin [Amoxicillin-Pot Clavulanate]      Bloating/constipation    Codeine NAUSEA ONLY and DIZZINESS    Adhesive Tape (Rosins) RASH    Kdc:Yellow Dye+Amoxicillin+Brilliant Blue Fcf ITCHING

## 2025-03-03 NOTE — H&P
History & Physical Examination    Patient Name: Mason Zamarripa  MRN: [id number]  [facility]: [id number]  YOB: 1960    Diagnosis: history of gastric fundic gland polyp with LGD in 10/2021. History of adenomatous colon polyps. Last EGD was 11/2022. Last colonoscopy was in 12/2021.     Present Illness: history of gastric fundic gland polyp with LGD in 10/2021. History of adenomatous colon polyps. Last EGD was 11/2022. Last colonoscopy was in 12/2021.     Prescriptions Prior to Admission[1]  Current Facility-Administered Medications   Medication Dose Route Frequency    lactated ringers infusion   Intravenous Continuous       Allergies: Allergies[2]    Past Medical History:    Acquired hypothyroidism    Allergic rhinitis, cause unspecified    Arrhythmia    history A-fib post left lung surgery; states no longer, sees cardiology yearly    Asthma (HCC)    last inhaler use few weeks ago    Back problem    CAD (coronary artery disease)    [facility]- mild to moderate coronary disease as described. Bridging in the mid LAD. LV function.-intact     Colon polyp    DR. Ruiz    COPD (chronic obstructive pulmonary disease) ([facility])    no home oxygen     Coronary atherosclerosis    Disorder of thyroid    Esophageal reflux    GERD    1/07 = EGD = schatzkis ring.    High cholesterol    HOSPITALIZATIONS    hosp for dyspnea- echo neg, stress neg, no pe, add advair, question gerd    lung cancer    Lung CA- neg pet  10/2010, RLL nodule unchanged from 09- benign    Lyme disease    Malignant neoplasm of bronchus and lung, unspecified site    Mild depression    Mixed hyperlipidemia due to type 2 diabetes mellitus (HCC)    Nontoxic multinodular goiter    few nodule left- due in aug 2012 repeat u/s    PONV (postoperative nausea and vomiting)    Prediabetes    Sleep apnea    CPAP    Visual impairment    reading glasses     Past Surgical History:   Procedure Laterality Date    Arthroscopy of joint unlisted      knee    Colonoscopy  07/09/12 (2015)     11/07-tub adenoma(2009)-> 12/09 hyperplastic polyp (2012) - 2012 small polyp    Colonoscopy      Colonoscopy N/A 12/09/2021    Procedure: COLONOSCOPY/ESOPHAGOGASTRODUODENOSCOPY (EGD);  Surgeon: Real Ruiz MD;  Location: [facility] ENDOSCOPY    Colonoscopy,biopsy  12/07/2009    Performed by REAL RUIZ at [facility]    Colonoscopy,biopsy  12/11/2012    1.) 3 mm rectal polyp = removed.    Egd N/A 10/11/2021    Procedure: ESOPHAGOGASTRODUODENOSCOPY with bxs COLONOSCOPY;  Surgeon: Real Ruiz MD;  Location: [facility]    Endometr ablate, thermal  2007    Hysterectomy  7/09- fibroids    laporoscopy hys (partial), ovaries intact, fibroids removed    Knee scope,med/lat menisectomy Left 03/19/2015    Procedure: ARTHROSCOPY KNEE LEFT;  Surgeon: Yony Sneed MD;  Location: [facility]    Laparoscopy,diagnostic      infertlity    Other surgical history  2005    Vocal cord repair    Other surgical history  10/16/2003    Thoracotomy - L side    Other surgical history Left 11/24/2015    Procedure: ANKLE OPEN REDUCTION INTERNAL FIXATION;  Surgeon: Shabbir Suero MD;  Location:  MAIN OR    Removal of lung,lobectomy      Special service or report  2003    left pneumonectomy    Up gi endoscopy,ball dil,30mm  04/28/2011    Performed by REAL RUIZ at [facility]    Upper gi endoscopy,biopsy  12/07/2009    Performed by REAL RUIZ at [facility]    Upper gi endoscopy,biopsy N/A 08/08/2014    Procedure: ESOPHAGOGASTRODUODENOSCOPY, POSSIBLE BIOPSY, POSSIBLE POLYPECTOMY 64954;  Surgeon: Real Ruiz MD;  Location: [facility]    Upper gi endoscopy,exam       Family History   Problem Relation Age of Onset    Cancer Maternal Grandmother         gall bladder    Diabetes Paternal Grandmother     Heart Disorder Brother         MI, required cardioversion     Gastro-Intestinal Disorder Father         diverticulitis    Stroke Paternal Grandfather      Social History      Tobacco Use    Smoking status: Former     Current packs/day: 0.00     Average packs/day: 1 pack/day for 25.0 years (25.0 ttl pk-yrs)     Types: Cigarettes     Start date: 10/16/1978     Quit date: 10/16/2003     Years since quittin.3    Smokeless tobacco: Never    Tobacco comments:     25   Substance Use Topics    Alcohol use: Yes     Alcohol/week: 0.0 standard drinks of alcohol     Comment: rare       SYSTEM Check if Review is Normal Check if Physical Exam is Normal If not normal, please explain:   HEENT [ ] [ ]    NECK & BACK [ ] [ ]    HEART [x ] [x ]    LUNGS [x ] [x ]    ABDOMEN [ x] [ x]    UROGENITAL [ ] [ ]    EXTREMITIES [ ] [ ]    OTHER        [ x ] I have discussed the risks and benefits and alternatives with the patient/family.  They understand and agree to proceed with plan of care.  [ x ] I have reviewed the History and Physical done within the last 30 days.  Any changes noted above.    Real Ruiz MD  3/3/2025  9:18 AM           [1]   Medications Prior to Admission   Medication Sig Dispense Refill Last Dose/Taking    semaglutide 2 MG/3ML Subcutaneous Solution Pen-injector Inject 0.5 mg into the skin once a week.   2025    ezetimibe 10 MG Oral Tab Take 1 tablet (10 mg total) by mouth daily.   3/2/2025    rosuvastatin 10 MG Oral Tab Take 1 tablet (10 mg total) by mouth daily.   3/2/2025    Melatonin 1 MG Oral Cap Take 1 capsule (1 mg total) by mouth nightly as needed.   Taking As Needed    MONTELUKAST 10 MG Oral Tab TAKE 1 TABLET(10 MG) BY MOUTH EVERY NIGHT 90 tablet 1 3/2/2025    pantoprazole 40 MG Oral Tab EC Take 1 tablet (40 mg total) by mouth 2 (two) times daily before meals. 180 tablet 1 3/3/2025 Morning    LEVOTHYROXINE 50 MCG Oral Tab TAKE 1 TABLET BY MOUTH EVERY MORNING BEFORE BREAKFAST 90 tablet 0 3/3/2025 Morning    Levocetirizine Dihydrochloride 5 MG Oral Tab Take 1 tablet (5 mg total) by mouth daily. 90 tablet 3 3/2/2025    Albuterol Sulfate  (90 Base) MCG/ACT  Inhalation Aero Soln Inhale 2 puffs into the lungs every 4 (four) hours as needed for Wheezing. 1 Inhaler 0 Taking As Needed    aspirin 81 MG Oral Tab Take 1 tablet (81 mg total) by mouth daily.   2/25/2025    ondansetron 4 MG Oral Tablet Dispersible Take 1 tablet (4 mg total) by mouth every 8 (eight) hours as needed for Nausea. (Patient not taking: Reported on 2/26/2025) 20 tablet 0 Not Taking    ibuprofen 800 MG Oral Tab ibuprofen 800 mg tablet   TAKE 1 TABLET BY MOUTH EVERY 6 HOURS AS NEEDED FOR PAIN (Patient not taking: Reported on 3/3/2025)   Not Taking    Levalbuterol HCl 0.63 MG/3ML Inhalation Nebu Soln Take 3 mL (0.63 mg total) by nebulization every 4 (four) hours as needed for Wheezing. 20 mL 0     FLUTICASONE PROPIONATE 50 MCG/ACT Nasal Suspension SHAKE LIQUID AND USE 1 SPRAY IN EACH NOSTRIL DAILY (Patient not taking: Reported on 2/26/2025) 16 g 2 Not Taking    Multiple Vitamins-Minerals (MULTIVITAMIN OR) Take  by mouth.   2/27/2025   [2]   Allergies  Allergen Reactions    Amoxicillin ITCHING    Diclofenac SHORTNESS OF BREATH     dyspepsia    Morphine Sulfate      BLOOD PRESURE DROPS    Opioid Analgesics NAUSEA AND VOMITING    Augmentin [Amoxicillin-Pot Clavulanate]      Bloating/constipation    Codeine NAUSEA ONLY and DIZZINESS    Adhesive Tape (Rosins) RASH    Kdc:Yellow Dye+Amoxicillin+Brilliant Blue Fcf ITCHING

## 2025-03-03 NOTE — OPERATIVE REPORT
ENDOSCOPY OPERATIVE REPORT  Patient Name: Mason Zamarripa  Medical Record #: [id number]  YOB: 1960  Date of Procedure: 3/3/2025    Preoperative Diagnosis: history of gastric fundic gland polyp with LGD in 10/2021. History of adenomatous colon polyps. Last EGD was 11/2022. Last colonoscopy was in 12/2021.   Postoperative Diagnosis:       1.) Prior polypectomy site identified in mid-gastric body from clip placed previously. The clip was removed by taking numerous biopsies from its base and surrounding tissue.     2.) Eight other small 3 - 4 mm benign gastric body polyps -- biopsies obtained.     3.) Non-obstructing Schatzki's ring -- broken with biopsy forceps.    4.) Small 2 cm hiatal hernia as seen previously.     5.) tigre-ampullary diverticulum with vegetable debris (cleared).     6.) Four polyps: 3 mm hepatic flexure, 13 mm transverse colon, 5 mm, 6 mm sigmoid colon = removed.   7.) Moderate Left sided and Mild Right sided diverticulosis.     8.) Internal hemorrhoids.   Procedure Performed: Esophagogastroduodenoscopy with biopsy and Colonoscopy with polypectomy. Withdrawal time: 22 minutes.   ASA Class: 3. Anesthesia Given: MAC. Moderate sedation time: NA. Deep sedation was provided by anesthesiologist.   Endoscopist: Real Ruiz M.D.  Procedure: After the patient was interviewed and the procedure again discussed and questions addressed, the patient was brought to the GI Lab and monitoring of the B/P, pulse, and pulse oximetry was performed. The patient was then placed in the left lateral decubitus position and sedated with divided doses of IV medication; continuous vital signs were monitored throughout the procedure. A time-out procedure was performed, history and physical were reviewed, medical reconciliation was complete, informed consent was obtained.   The Olympus videogastroscope was intubated into the esophagus and advanced into the duodenum under directed vision without difficulty. Then  withdrawn. The patient was repositioned and prepared for the colonoscopy. The scope was inserted through the rectum and advanced to the cecum as identified by the appendiceal orifice and ileocecal valve. The quality of the preparation was Aronchick score 3. A thorough exam was performed throughout the procedures. The patient tolerated the procedures well.   Aronchick Bowel Prep:  Score:  1 = Excellent (>95% mucosa seen)   2 = Good (clear liquid up to 25% of mucosa, 90% mucosa seen)   3 = fair (semisolid stool not suctioned, but 90% mucosa seen)   4 = poor (semisolid stool not suctioned, <90% mucosa seen)   5 = inadequate (repeat prep needed).  FINDINGS: The esophagus mucosa had an overall normal appearance except for a minimal non-obstructing Schatzki's ring at the GE-junction -- this was broken circumferentially with biopsy forceps. The gastric lumen was then entered and views of the gastric mucosa as well as retroverted views of the fundus which revealed a 2 cm hiatal hernia. Prior polypectomy site was identified in stomach body from a clip placed previously. Questionable residual polyp tissue captured by the clip; numerous biopsies from surrounding clip / clip base obtained and the clip removed. Eight other benign appearing gastric body polyps noted as above --sample biopsies obtained.  A normal pylorus was passed and the duodenal bulb entered, the duodenal bulb and post-bulbar duodenum were  examined. A tigre-ampullary diverticulum was noted with large amount of vegetable debris -- this was cleared. unremarkable. The scope was then withdrawn and the procedure terminated.   A colonoscopy was then performed. Four polyps were noted as above and removed. The 13 mm polyp was removed using hot snare polypectomy technique. The remainder of the polyps were removed using cold biopsy forceps. The overall visualized mucosal pattern of the colon was unremarkable. Moderate Left sided and Mild Right sided diverticulosis. At  the anal verge the endoscope was retroverted, internal hemorrhoids, no other abnormalities were indentified.  The procedure was tolerated well and upon completion and throughtout the vital signs were stable.  COMPLICATIONS: None.  PLAN: Patient is to follow a high fiber, low fat diet and followup with primary care for further care. Await biopsy results. The patient and  were informed of the endoscopic findings and was also given a copy of the findings, postoperative instructions, and postoperative precautions.  Real Ruiz M.D.  [facility]

## 2025-03-03 NOTE — DISCHARGE INSTRUCTIONS
Home Care Instructions for Colonoscopy and/or Gastroscopy with Sedation    Diet:  - Resume your regular diet as tolerated unless otherwise instructed.  - Start with light meals to minimize bloating.  - Do not drink alcohol today.    Medication:  - If you have questions about resuming your normal medications, please contact your Primary Care Physician.    Activities:  - Take it easy today. Do not return to work today.  - Do not drive today.  - Do not operate any machinery today (including kitchen equipment).    Colonoscopy:  - You may notice some rectal \"spotting\" (a little blood on the toilet tissue) for a day or two after the exam. This is normal.  - If you experience any rectal bleeding (not spotting), persistent tenderness or sharp severe abdominal pains, oral temperature over 100 degrees Fahrenheit, light-headedness or dizziness, or any other problems, contact your doctor.    Gastroscopy:  - You may have a sore throat for 2-3 days following the exam. This is normal. Gargling with warm salt water (1/2 tsp salt to 1 glass warm water) or using throat lozenges will help.  - If you experience any sharp pain in your neck, abdomen or chest, vomiting of blood, oral temperature over 100 degrees Fahrenheit, light-headedness or dizziness, or any other problems, contact your doctor.    **If unable to reach your doctor, please go to the [facility] Emergency Room**    - Your referring physician will receive a full report of your examination.  - If you do not hear from your doctor's office within two weeks of your biopsy, please call them for your results.    You may be able to see your laboratory results in [employer] between 4 and 7 business days.  In some cases, your physician may not have viewed the results before they are released to [employer].  If you have questions regarding your results contact the physician who ordered the test/exam by phone or via [employer] by choosing \"Ask a Medical Question.\"

## (undated) DEVICE — KIT ENDO ORCAPOD 160/180/190

## (undated) DEVICE — 3 ML SYRINGE LUER-LOCK TIP: Brand: MONOJECT

## (undated) DEVICE — CLIP RESOLUTION 235CM

## (undated) DEVICE — FORCEP RADIAL JAW 4

## (undated) DEVICE — CONMED SCOPE SAVER BITE BLOCK, 20X27 MM: Brand: SCOPE SAVER

## (undated) DEVICE — YANKAUER SUCTION INSTRUMENT NO CONTROL VENT, BULB TIP, CLEAR: Brand: YANKAUER

## (undated) DEVICE — SNARE LARIAT HEXAGONAL 10X28

## (undated) DEVICE — 35 ML SYRINGE REGULAR TIP: Brand: MONOJECT

## (undated) DEVICE — 6 ML SYRINGE LUER-LOCK TIP: Brand: MONOJECT

## (undated) DEVICE — LINE MNTR ADLT SET O2 INTMD

## (undated) DEVICE — REM POLYHESIVE ADULT PATIENT RETURN ELECTRODE: Brand: VALLEYLAB

## (undated) DEVICE — MEDI-VAC NON-CONDUCTIVE SUCTION TUBING 6MM X 1.8M (6FT.) L: Brand: CARDINAL HEALTH

## (undated) DEVICE — TRAP MCS 40ML 5IN PLS SCR CAP

## (undated) DEVICE — KIT CLEAN ENDOKIT 1.1OZ GOWNX2

## (undated) DEVICE — NEEDLE CONTRAST INTERJECT 25G

## (undated) NOTE — MR AVS SNAPSHOT
[address] 24 [phone]               Thank you for choosing us for your health care visit with JL Reilly.   We are glad to serve you and happy to provide you with this summary Lifestyle Changes for Controlling GERD    When you have GERD, stomach acid feels as if it’s backing up toward your mouth. Whether or not you take medicine to control your GERD, your symptoms can often be improved with lifestyle changes.  Talk to your health professional's instructions.                Allergies as of May 09, 2017     Amoxicillin Itching    Morphine Sulfate     BLOOD PRESURE DROPS    Opioid Analgesics Nausea and vomiting    Augmentin [Amoxicillin-Pot Clavulanate]     Bloating/constipation    Adh Pantoprazole Sodium 40 MG Tbec   Take 1 tablet (40 mg total) by mouth daily. Commonly known as:  PROTONIX           Topiramate ER 25 MG Cp24   Take 25 mg by mouth every morning.    Commonly known as:  TROKENDI XR           VSL#3 Caps   Take 1 capsule by

## (undated) NOTE — MR AVS SNAPSHOT
[address] 94 [phone]               Thank you for choosing us for your health care visit with JL Tenorio.   We are glad to serve you and happy to provide you with this summary poultry, fish and seafood each day. Vary your protein. Choose more:  · Fish and other seafood  · Lean low-fat meat and poultry  · Eggs  · Beans, peas  · Tofu  · Unsalted nuts and seeds  Choose less high-fat and red meat.    Source: [employer], www.choose Note: Choose whole grains for at least half of your servings each day.     Dairy 2½ Servings or 2½ Cups  One serving is:  1 cup milk  1½ ounces reduced-fat hard cheese  2 ounces processed cheese  1 cup low-fat yogurt  1/3 cup shredded cheese  Note: Choose · Low-fat or fat-free cottage cheese or other reduced-fat cheeses  · Calcium-fortified milk alternatives Choose low-fat or lean meats, poultry, fish and seafood each day. Vary your protein.  Choose more:  · Fish and other seafood  · Lean low-fat meat and p One serving is:  1 slice bread  1 cup dry cereal  ½ cup cooked rice, pasta, or cereal  1 5-inch tortilla  Note: Choose whole grains for at least half of your servings each day.     Dairy 3 Servings or 3 Cups  One serving is:  1 cup milk  1½ ounces reduced- [phone]            May 11, 2017  3:30 PM   Follow up with JL Quezada   [facility] ([facility])    [facility] [address] [address] [phone]              Allergies as of Feb 16, 2017     Tommy Lal Take 1 tablet (40 mg total) by mouth daily. Commonly known as:  PROTONIX           VSL#3 Caps   Take 1 capsule by mouth daily.                 Where to Get Your Medications      [id number] [address], [address] [place] [address]

## (undated) NOTE — LETTER
[address]  Authorization for Surgical Operation and Procedure                                                                                           1. I hereby Peggy Kapoor MD, my physician and his/her assistants (if applicable), which may include medical students, residents, and/or fellows, to perform the following surgical operation/ procedure and administer such anesthesia as may be determined necessary by my physician: Operation/Procedure name (s) ESOPHAGOGASTRODUODENOSCOPY (EGD) on [facility]   2. I recognize that during the surgical operation/procedure, unforeseen conditions may necessitate additional or different procedures than those listed above. I, therefore, further authorize and request that the above-named surgeon, assistants, or designees perform such procedures as are, in their judgment, necessary and desirable. 3.   My surgeon/physician has discussed prior to my surgery the potential benefits, risks and side effects of this procedure; the likelihood of achieving goals; and potential problems that might occur during recuperation. They also discussed reasonable alternatives to the procedure, including risks, benefits, and side effects related to the alternatives and risks related to not receiving this procedure. I have had all my questions answered and I acknowledge that no guarantee has been made as to the result that may be obtained. 4.   Should the need arise during my operation/procedure, which includes change of level of care prior to discharge, I also consent to the administration of blood and/or blood products. Further, I understand that despite careful testing and screening of blood or blood products by collecting agencies, I may still be subject to ill effects as a result of receiving a blood transfusion and/or blood products.   The following are some, but not all, of the potential risks that can occur: fever and allergic reactions, hemolytic reactions, transmission of diseases such as Hepatitis, AIDS and Cytomegalovirus (CMV) and fluid overload. In the event that I wish to have an autologous transfusion of my own blood, or a directed donor transfusion, I will discuss this with my physician. Check only if Refusing Blood or Blood Products  I understand refusal of blood or blood products as deemed necessary by my physician may have serious consequences to my condition to include possible death. I hereby assume responsibility for my refusal and release the hospital, its personnel, and my physicians from any responsibility for the consequences of my refusal.           ____ Refuse      5. I authorize the use of any specimen, organs, tissues, body parts or foreign objects that may be removed from my body during the operation/procedure for diagnosis, research or teaching purposes and their subsequent disposal by hospital authorities. I also authorize the release of specimen test results and/or written reports to my treating physician on the hospital medical staff or other referring or consulting physicians involved in my care, at the discretion of the Pathologist or my treating physician. 6.   I consent to the photographing or videotaping of the operations or procedures to be performed, including appropriate portions of my body for medical, scientific, or educational purposes, provided my identity is not revealed by the pictures or by descriptive texts accompanying them. If the procedure has been photographed/videotaped, the surgeon will obtain the original picture, image, videotape or CD. The hospital will not be responsible for storage, release or maintenance of the picture, image, tape or CD.    7.   I consent to the presence of a  or observers in the operating room as deemed necessary by my physician or their designees.     8.   I recognize that in the event my procedure results in extended X-Ray/fluoroscopy time, I may develop a skin reaction. 9. If I have a Do Not Attempt Resuscitation (DNAR) order in place, that status will be suspended while in the operating room, procedural suite, and during the recovery period unless otherwise explicitly stated by me (or a person authorized to consent on my behalf). The surgeon or my attending physician will determine when the applicable recovery period ends for purposes of reinstating the DNAR order. 10. Patients having a sterilization procedure: I understand that if the procedure is successful the results will be permanent and it will therefore be impossible for me to inseminate, conceive, or bear children. I also understand that the procedure is intended to result in sterility, although the result has not been guaranteed. 11. I acknowledge that my physician has explained sedation/analgesia administration to me including the risk and benefits I consent to the administration of sedation/analgesia as may be necessary or desirable in the judgment of my physician. I CERTIFY THAT I HAVE READ AND FULLY UNDERSTAND THE ABOVE CONSENT TO OPERATION and/or OTHER PROCEDURE.     _________________________________________ _________________________________     ___________________________________  Signature of Patient     Signature of Responsible Person                   Printed Name of Responsible Person                              _________________________________________ ______________________________        ___________________________________  Signature of Witness         Date  Time         Relationship to Patient    STATEMENT OF PHYSICIAN My signature below affirms that prior to the time of the procedure; I have explained to the patient and/or his/her legal representative, the risks and benefits involved in the proposed treatment and any reasonable alternative to the proposed treatment.  I have also explained the risks and benefits involved in refusal of the proposed treatment and alternatives to the proposed treatment and have answered the patient's questions.  If I have a significant financial interest in a co-management agreement or a significant financial interest in any product or implant, or other significant relationship used in this procedure/surgery, I have disclosed this and had a discussion with my patient.     _______________________________________________________________ _____________________________  Aldon Kawasaki  )                                                                                         (Date)                                   (Time)  Patient Name: Efrain Vasquez    : 1/10/1960   Printed: 2022      Medical Record #: [id number]                                              Page 1 of 1

## (undated) NOTE — MR AVS SNAPSHOT
[address] 70 [phone]               Thank you for choosing us for your health care visit with JL Beasley.   We are glad to serve you and happy to provide you with this summary · Order 1 or 2 low-fat appetizers or soup and a salad instead of a main dish. Or eat only half of the main dish and take the rest home. · If you want a dessert, try fresh fruit, nonfat yogurt, or sorbet. Or share a dessert.   Foods to avoid  · Donuts, muff Kdc:Yellow Dye+Amoxicillin+Brilliant Blue Fcf Itching                Today's Vital Signs     BP Pulse Height Weight BMI    102/68 mmHg 80 64\" 213 lb 36.54 kg/m2         Current Medications          This list is accurate as of: 1/19/17  4:06 PM.  Always u You can access your MyChart to more actively manage your health care and view more details from this visit by going to [email or web address].   If you've recently had a stay at the Hospital you can access your discharge instructions in [address] by oracio

## (undated) NOTE — LETTER
[address], [place], [place]    Authorization for Surgical Operation and Procedure                               I hereby authorize Real Ruiz MD, my physician and his/her assistants (if applicable), which may include medical students, residents, and/or fellows, to perform the following surgical operation/ procedure and administer such anesthesia as may be determined necessary by my physician: Operation/Procedure name (s) COLONOSCOPY / ESOPHAGOGASTRODUODENOSCOPY on Mason Zamarripa   2.   I recognize that during the surgical operation/procedure, unforeseen conditions may necessitate additional or different procedures than those listed above.  I, therefore, further authorize and request that the above-named surgeon, assistants, or designees perform such procedures as are, in their judgment, necessary and desirable.    3.   My surgeon/physician has discussed prior to my surgery the potential benefits, risks and side effects of this procedure; the likelihood of achieving goals; and potential problems that might occur during recuperation.  They also discussed reasonable alternatives to the procedure, including risks, benefits, and side effects related to the alternatives and risks related to not receiving this procedure.  I have had all my questions answered and I acknowledge that no guarantee has been made as to the result that may be obtained.    4.   Should the need arise during my operation/procedure, which includes change of level of care prior to discharge, I also consent to the administration of blood and/or blood products.  Further, I understand that despite careful testing and screening of blood or blood products by collecting agencies, I may still be subject to ill effects as a result of receiving a blood transfusion and/or blood products.  The following are some, but not all, of the potential risks that can occur: fever and allergic reactions, hemolytic reactions,  transmission of diseases such as Hepatitis, AIDS and Cytomegalovirus (CMV) and fluid overload.  In the event that I wish to have an autologous transfusion of my own blood, or a directed donor transfusion, I will discuss this with my physician.  Check only if Refusing Blood or Blood Products  I understand refusal of blood or blood products as deemed necessary by my physician may have serious consequences to my condition to include possible death. I hereby assume responsibility for my refusal and release the hospital, its personnel, and my physicians from any responsibility for the consequences of my refusal.    o  Refuse   5.   I authorize the use of any specimen, organs, tissues, body parts or foreign objects that may be removed from my body during the operation/procedure for diagnosis, research or teaching purposes and their subsequent disposal by hospital authorities.  I also authorize the release of specimen test results and/or written reports to my treating physician on the hospital medical staff or other referring or consulting physicians involved in my care, at the discretion of the Pathologist or my treating physician.    6.   I consent to the photographing or videotaping of the operations or procedures to be performed, including appropriate portions of my body for medical, scientific, or educational purposes, provided my identity is not revealed by the pictures or by descriptive texts accompanying them.  If the procedure has been photographed/videotaped, the surgeon will obtain the original picture, image, videotape or CD.  The hospital will not be responsible for storage, release or maintenance of the picture, image, tape or CD.    7.   I consent to the presence of a  or observers in the operating room as deemed necessary by my physician or their designees.    8.   I recognize that in the event my procedure results in extended X-Ray/fluoroscopy time, I may develop a skin reaction.    9. If  I have a Do Not Attempt Resuscitation (DNAR) order in place, that status will be suspended while in the operating room, procedural suite, and during the recovery period unless otherwise explicitly stated by me (or a person authorized to consent on my behalf). The surgeon or my attending physician will determine when the applicable recovery period ends for purposes of reinstating the DNAR order.  10. Patients having a sterilization procedure: I understand that if the procedure is successful the results will be permanent and it will therefore be impossible for me to inseminate, conceive, or bear children.  I also understand that the procedure is intended to result in sterility, although the result has not been guaranteed.   11. I acknowledge that my physician has explained sedation/analgesia administration to me including the risk and benefits I consent to the administration of sedation/analgesia as may be necessary or desirable in the judgment of my physician.    I CERTIFY THAT I HAVE READ AND FULLY UNDERSTAND THE ABOVE CONSENT TO OPERATION and/or OTHER PROCEDURE.     ____________________________________  _________________________________        ______________________________  Signature of Patient    Signature of Responsible Person                Printed Name of Responsible Person                                      ____________________________________  _____________________________                ________________________________  Signature of Witness        Date  Time         Relationship to Patient    STATEMENT OF PHYSICIAN My signature below affirms that prior to the time of the procedure; I have explained to the patient and/or his/her legal representative, the risks and benefits involved in the proposed treatment and any reasonable alternative to the proposed treatment. I have also explained the risks and benefits involved in refusal of the proposed treatment and alternatives to the proposed treatment and have  answered the patient's questions. If I have a significant financial interest in a co-management agreement or a significant financial interest in any product or implant, or other significant relationship used in this procedure/surgery, I have disclosed this and had a discussion with my patient.     _____________________________________________________              _____________________________  (Signature of Physician)                                                                                         (Date)                                   (Time)  Patient Name: Mason BARRY Zamarripa      : 1/10/1960      Printed: 2025     Medical Record #: [id number]                                      Page 1 of 1

## (undated) NOTE — MR AVS SNAPSHOT
[address] 01 [phone]               Thank you for choosing us for your health care visit with JL Lockwood.   We are glad to serve you and happy to provide you with this summary Encounter for therapeutic drug monitoring    -  Primary      Instructions and Information about Your Health      Self-Care for Sinusitis     Drinking plenty of water can help sinuses drain. Sinusitis can often be managed with self-care.  Self-care can ke substitute for professional medical care. Always follow your healthcare professional's instructions.              Allergies as of Mar 16, 2017     Amoxicillin Itching    Morphine Sulfate     BLOOD PRESURE DROPS    Opioid Analgesics Nausea and vomiting    Au Take 1 capsule by mouth daily. What changed:  Another medication with the same name was added. Make sure you understand how and when to take each. * VSL#3 Caps   Take 1 capsule by mouth every morning. What changed:   You were already taking a

## (undated) NOTE — LETTER
[facility]  Administration of Anesthesia  IMason agree to be cared for by a physician anesthesiologist alone and/or with a nurse anesthetist, who is specially trained to monitor me and give me medicine to put me to sleep or keep me comfortable during my procedure    I understand that my anesthesiologist and/or anesthetist is not an employee or agent of [facility] or [employer]. He or she works for [facility]    As the patient asking for anesthesia services, I agree to:  Allow the anesthesiologist (anesthesia doctor) to give me medicine and do additional procedures as necessary. Some examples are: Starting or using an “IV” to give me medicine, fluids or blood during my procedure, and having a breathing tube placed to help me breathe when I’m asleep (intubation). In the event that my heart stops working properly, I understand that my anesthesiologist will make every effort to sustain my life, unless otherwise directed by [facility] Do Not Resuscitate documents.  Tell my anesthesia doctor before my procedure:  If I am pregnant.  The last time that I ate or drank.  iii. All of the medicines I take (including prescriptions, herbal supplements, and pills I can buy without a prescription (including street drugs/illegal medications). Failure to inform my anesthesiologist about these medicines may increase my risk of anesthetic complications.  iv.If I am allergic to anything or have had a reaction to anesthesia before.  I understand how the anesthesia medicine will help me (benefits).  I understand that with any type of anesthesia medicine there are risks:  The most common risks are: nausea, vomiting, sore throat, muscle soreness, damage to my eyes, mouth, or teeth (from breathing tube placement).  Rare risks include: remembering what happened during my procedure, allergic reactions to medications, injury to my airway, heart, lungs, vision, nerves, or  muscles and in extremely rare instances death.  My doctor has explained to me other choices available to me for my care (alternatives).  Pregnant Patients (“epidural”):  I understand that the risks of having an epidural (medicine given into my back to help control pain during labor), include itching, low blood pressure, difficulty urinating, headache or slowing of the baby’s heart. Very rare risks include infection, bleeding, seizure, irregular heart rhythms and nerve injury.  Regional Anesthesia (“spinal”, “epidural”, & “nerve blocks”):  I understand that rare but potential complications include headache, bleeding, infection, seizure, irregular heart rhythms, and nerve injury.    _____________________________________________________________________________  Patient (or Representative) Signature/Relationship to Patient  Date   Time    _____________________________________________________________________________   Name (if used)    Language/Organization   Time    _____________________________________________________________________________  Nurse Anesthetist Signature     Date   Time  _____________________________________________________________________________  Anesthesiologist Signature     Date   Time  I have discussed the procedure and information above with the patient (or patient’s representative) and answered their questions. The patient or their representative has agreed to have anesthesia services.    _____________________________________________________________________________  Witness        Date   Time  I have verified that the signature is that of the patient or patient’s representative, and that it was signed before the procedure  Patient Name: Mason Zamarripa     : 1/10/1960                 Printed: 2025 at 9:12 AM    Medical Record #: [id number]                                            Page 1 of 1  ----------ANESTHESIA CONSENT----------

## (undated) NOTE — LETTER
[address], [place]  Authorization for Invasive Procedures  1. I hereby authorize Dr. Monica Bautista , my physician and whomever may be designated as the doctor's assistant, to perform the following operation and/or procedure:  Esophagogastroduodenoscopy on [facility] at [facility].    2. My physician has explained to me the nature and purpose of the operation or other procedure, possible alternative methods of treatment, the risks involved and the possibility of complications to me. I understand the probable consequences of declining the recommended procedure and the alternative methods of treatment. I acknowledge that no guarantee has been made as to the result that may be obtained. 3. I recognize that during the course of this operation or other procedure, unforeseen conditions may necessitate additional or different procedures than those listed above. I, therefore, further authorize and request that the above-named physician, his/her physician assistants, or designees perform such procedures as are, in his/her professional opinion, necessary and desirable. If I have a Do Not Attempt Resuscitation (DNAR) order in place, that status will be suspended while in the operating room, procedural suite, and during the recovery period unless otherwise explicitly stated by me (or a person authorized to consent on my behalf). The surgeon or my attending physician will determine when the applicable recovery period ends for purposes of reinstating the DNAR order. 4. Should the need arise during my operation or immediate post-operative period; I also consent to the administration of blood and/or blood products.  Further, I understand that despite careful testing and screening of blood and blood products, I may still be subject to ill effects as a result of recieving a blood transfusion an/or blood producst. The following are some, but not all, of the potential risks that can occur: fever and allergic reactions, hemolytic reactions, transmission of disease such as hepatitis, AIDS, cytomegalovirus (CMV), and flluid overload. In the event that I wish to have autologous transfusions of my own blood, or a directed donor transfusion, I will discuss this with my physician. 5. I consent to the photographing of the operations or procedures to be performed for the purposes of advancing medicine, science, and/or education, provided my identity is not revealed. If the procedure has been videotaped, the physician/surgeon will obtain the original videotape. The hospital will not be responsible for storage or maintenance of this tape. 6. I consent to the presence of a  or observer as deemed necessary by my physician or his designee. 7. Any tissues or organs removed in the operation or other procedure may be disposed of by and at the discretion of [facility].    8. I understand that the physician and his/her physician assistants may not be employees or agents of [facility], [facility], nor [facility], but are independent medical practitioners who have been permitted to use its facilities for the care and treatment of their patients. 9. Patients having a sterilization procedure: I understand that if the procedure is successful the results will be permanent and it will therefore be impossible for me to inseminate, conceive or bear children. I also understand that the procedure is intended to result in sterility, although the result has not been guaranteed. 10. I CERTIFY THAT I HAVE READ AND FULLY UNDERSTAND THE ABOVE CONSENT TO OPERATION and/or OTHER PROCEDURE. 11. I acknowledge that my physician has explained sedation/analgesia administration to me including the risks and benefits. I consent to the administration of sedation/analgesia as may be necessary or desirable in the judgment of my physician. Signature of Patient:  ________________________________________________ Date: _________Time: _________    Responsible person in case of minor or unconscious: _____________________________Relationship: ____________     Witness Signature: ____________________________________________ Date: __________ Time: ___________    Statement of Physician  My signature below affirms that prior to the time of the procedure, I have explained to the patient and/or her legal representative, the risks and benefits involved in the proposed treatment and any reasonable alternative to the proposed treatment. I have also explained the risks and benefits involved in the refusal of the proposed treatment and have answered the patient's questions. If I have a significant financial interest in this procedure/surgery, I have disclosed this and had a discussion with my patient.     Signature of Physician:   ________________________________________Date: _________Time:_______ Patient Name: Evelin Bonilla  : 1/10/1960   Printed: May 16, 2022    Medical Record #: [id number]

## (undated) NOTE — LETTER
[facility]  Administration of Anesthesia  1. Arlyne Nageotte, or _________________________________ acting on her behalf, (Patient) (Dependent/Representative) request to receive anesthesia for my pending procedure/operation/treatment. A physician (anesthesiologist) alone or an anesthesiologist working with a nurse anesthetist may administer my anesthesia. 2. I understand that my anesthesiologist is not an employee or agent of the hospital, but is an independent medical practitioner who has been permitted to use its facilities for the care and treatment of his/her patients. 3. I acknowledge that a physician from [facility] or their designate(s), recommended anesthesia for me using her/his medical judgment. The type(s) of anesthesia I may receive include:                a) General Anesthesia, b) Spinal/Epidural Anesthesia, c) Regional Anesthesia or d) Monitored Anesthesia Care. 4. If my spinal, regional or monitored anesthesia care (local) is not satisfactory for my comfort, or if my medical condition requires, I consent to the administration of general anesthesia. 5. I am aware that the practice of anesthesiology is not an exact science and that some foreseeable risks or consequences may occur. Some common risks/consequences include sore throat and hoarseness, nausea and vomiting, muscle soreness, backache, damage to the mouth/teeth/vocal cords and eye injury. I understand that more rare but serious potential risks of anesthesia include blood pressure changes, drug reactions, cardiac arrest, brain damage, paralysis or death. These risks apply to whether I have general, spinal/epidural, regional or monitored anesthesia care. 6. OBSTETRIC PATIENTS: Specific risks/consequences of spinal/epidural anesthesia may include itching, low blood pressure, difficulty urinating, slowing of the baby's heart rate and headache.  Rare risks include infections, high spinal block, spinal bleeding, seizure, cardiac arrest and death. 7. AWARENESS: I understand that it is possible (but unlikely) to have explicit memory of events from the operating room while under general anesthesia. 8. ELECTROCONVULSIVE THERAPY PATIENTS: This consent serves for all treatments in a single course of therapy. 9. I understand that I must inform my anesthesiologist when I last ate and/or drank to minimize the risk of anesthesia. 10. If I am pregnant, or may pregnant, I understand that elective surgery should be postponed until after the baby is born. Anesthetics cross the placenta and may temporarily anesthetize the baby. Although fetal complications of anesthesia during pregnancy are rare, they may include birth defects, premature labor, brain damage and death. 11. I certify that I informed the anesthesiologist, to the best of my ability, about medication I take including blood thinners, anticoagulants, herbal remedies, narcotics and recreational drugs (e.g. cocaine, marijuana, PCP). Failure to inform my anesthesiologist about these medicines may increase my risk of anesthetic complications. The nature and purpose of my anesthetic management was explained to me. I had the opportunity to ask questions and the answers and information provided meet my satisfaction.   I retain the right to withdraw this consent at any time prior to the administration of said anesthetic.    ___________________________________________________           _____________________________________________________  Patient Signature                                                                                      Witness Signature                ___________________________________________________           _____________________________________________________  Date/Time                                                                                               Responsible person in case of minor/ unconscious pt /Relationship    My signature below affirms that prior to the time of the procedure, I have explained to the patient and/or his/her guardian, the risks and benefits of undergoing anesthesia, as well as any reasonable alternatives.     ___________________________________________________            _____________________________________________________  Physician Signature                            Date/Time  Patient Name: Brad Canela     : 1/10/1960     Printed: 2022      Medical Record #: [id number]                              Page 1 of 1    ----------ANESTHESIA CONSENT----------

## (undated) NOTE — MR AVS SNAPSHOT
[address] 34 [phone]               Thank you for choosing us for your health care visit with JL Nassar.   We are glad to serve you and happy to provide you with this summary You don't have to give up eating out to cut down on fat, cholesterol, and salt. You just need to think about what you order. Many menus highlight low-fat and low-sodium dishes. But if you can't find what you want, ask.  Explain what you need to the  o salt. You just need to think about what you order. Many menus highlight low-fat and low-sodium dishes. But if you can't find what you want, ask. Explain what you need to the  or . Or ask to see printed nutrition information.     Ask for what y Bloating/constipation    Adhesive Tape (Rosins) Rash    Codeine     Diclofenac     dyspepsia    Kdc:Yellow Dye+Amoxicillin+Brilliant Blue Fcf Itching                Today's Vital Signs     BP Pulse Height Weight BMI    110/70 mmHg 88 64\" 207 lb 35.51 kg/ Topiramate ER 25 MG Cp24   Take 25 mg by mouth every morning. Commonly known as:  TROKENDI XR           VSL#3 Caps   Take 1 capsule by mouth daily.                 Where to Get Your Medications      These medications were sent to [facility]

## (undated) NOTE — MR AVS SNAPSHOT
[address] [phone]               Thank you for choosing us for your health care visit with Freda Haq RD.   We are glad to serve you and happy to provide you with this summary of dyspepsia    Kdc:Yellow Dye+Amoxicillin+Brilliant Blue Fcf Itching                Today's Vital Signs     Weight                   207 lb              Current Medications          This list is accurate as of: 4/13/17 11:59 PM.  Always use your most recent Take 1 capsule by mouth daily.                 Where to Get Your Medications      These medications were sent to [facility] [address], [phone], [facility], [address] [phone]